# Patient Record
Sex: FEMALE | Race: WHITE | NOT HISPANIC OR LATINO | Employment: FULL TIME | URBAN - METROPOLITAN AREA
[De-identification: names, ages, dates, MRNs, and addresses within clinical notes are randomized per-mention and may not be internally consistent; named-entity substitution may affect disease eponyms.]

---

## 2017-01-07 DIAGNOSIS — J44.1 COPD EXACERBATION: Primary | ICD-10-CM

## 2017-01-07 RX ORDER — MOXIFLOXACIN HYDROCHLORIDE 400 MG/1
400 TABLET ORAL DAILY
Qty: 5 TABLET | Refills: 0 | Status: SHIPPED | OUTPATIENT
Start: 2017-01-07 | End: 2017-01-12

## 2017-01-16 ENCOUNTER — TELEPHONE (OUTPATIENT)
Dept: INTERNAL MEDICINE | Facility: CLINIC | Age: 57
End: 2017-01-16

## 2017-01-16 DIAGNOSIS — R06.09 DYSPNEA ON EXERTION: ICD-10-CM

## 2017-01-16 DIAGNOSIS — R60.1 GENERALIZED EDEMA: ICD-10-CM

## 2017-01-16 DIAGNOSIS — J44.9 CHRONIC OBSTRUCTIVE PULMONARY DISEASE, UNSPECIFIED COPD TYPE: Primary | ICD-10-CM

## 2017-01-16 DIAGNOSIS — R09.02 HYPOXIA: ICD-10-CM

## 2017-01-16 RX ORDER — FUROSEMIDE 40 MG/1
40 TABLET ORAL DAILY
Qty: 3 TABLET | Refills: 0 | Status: SHIPPED | OUTPATIENT
Start: 2017-01-16 | End: 2017-01-23 | Stop reason: SDUPTHER

## 2017-01-17 ENCOUNTER — TELEPHONE (OUTPATIENT)
Dept: INTERNAL MEDICINE | Facility: CLINIC | Age: 57
End: 2017-01-17

## 2017-01-17 DIAGNOSIS — R09.02 HYPOXIA: Primary | ICD-10-CM

## 2017-01-17 DIAGNOSIS — R60.1 GENERALIZED EDEMA: ICD-10-CM

## 2017-01-17 DIAGNOSIS — R06.09 DYSPNEA ON EXERTION: ICD-10-CM

## 2017-01-18 ENCOUNTER — HOSPITAL ENCOUNTER (OUTPATIENT)
Dept: PULMONOLOGY | Facility: CLINIC | Age: 57
Discharge: HOME | End: 2017-01-18
Payer: COMMERCIAL

## 2017-01-18 VITALS — WEIGHT: 161.63 LBS | BODY MASS INDEX: 29.74 KG/M2 | HEIGHT: 62 IN

## 2017-01-18 DIAGNOSIS — J44.9 CHRONIC OBSTRUCTIVE PULMONARY DISEASE, UNSPECIFIED COPD TYPE: ICD-10-CM

## 2017-01-18 DIAGNOSIS — R09.02 HYPOXIA: ICD-10-CM

## 2017-01-18 DIAGNOSIS — R06.09 DYSPNEA ON EXERTION: ICD-10-CM

## 2017-01-18 LAB
PRE FEV1 FVC: 42
PRE FEV1: 0.58
PRE FVC: 1.38
PREDICTED FEV1 FVC: 82
PREDICTED FEV1: 2.36
PREDICTED FVC: 2.89

## 2017-01-18 PROCEDURE — 82803 BLOOD GASES ANY COMBINATION: CPT | Mod: S$GLB,,, | Performed by: INTERNAL MEDICINE

## 2017-01-18 PROCEDURE — 36600 WITHDRAWAL OF ARTERIAL BLOOD: CPT | Mod: 59,S$GLB,, | Performed by: INTERNAL MEDICINE

## 2017-01-18 PROCEDURE — 94620 PR PULMONARY STRESS TESTING,SIMPLE: CPT | Mod: S$GLB,,, | Performed by: INTERNAL MEDICINE

## 2017-01-18 PROCEDURE — 94727 GAS DIL/WSHOT DETER LNG VOL: CPT | Mod: S$GLB,,, | Performed by: INTERNAL MEDICINE

## 2017-01-18 PROCEDURE — 94010 BREATHING CAPACITY TEST: CPT | Mod: 59,S$GLB,, | Performed by: INTERNAL MEDICINE

## 2017-01-18 PROCEDURE — 94729 DIFFUSING CAPACITY: CPT | Mod: S$GLB,,, | Performed by: INTERNAL MEDICINE

## 2017-01-22 NOTE — PROCEDURES
Diana Sellers is a 56 y.o.  female patient, who presents for a 6 minute walk test ordered by Bladimir Bowles MD.  The diagnosis is COPD/Emphysema.  The patient's BMI is 29.6 kg/m2.  Predicted distance (lower limit of normal) is 372.65 meters.      Test Results:    The test was completed with stops due to shortness of breath.  The patient stopped 1 time for a total of 14 seconds.  The total time walked was 346 seconds.  During walking, the patient reported:  Dyspnea. The patient used no assistive devices during testing.     01/18/2017---------Distance: 426.72 meters (1400 feet)     O2 Sat % Supplemental Oxygen Heart Rate Blood Pressure Erich Scale   Pre-exercise  (Resting) 98 % Room Air 86 bpm 113/75 mmHg 2   During Exercise 85 % Room Air 130 bpm 140/79 mmHg 7-8   Post-exercise  (Recovery) 95 % Room Air  98 bpm 136/75 mmHg       Recovery Time: 117 seconds    Performing nurse/tech: Elsa LONGO      PREVIOUS STUDY:   The patient has not had a previous study.      CLINICAL INTERPRETATION:  Six minute walk distance is 426.72 meters (1400 feet) with very heavy dyspnea.  During exercise, there was significant desaturation while breathing room air.  Blood pressure remained stable and Heart rate increased significantly with walking.  The patient did not report non-pulmonary symptoms during exercise.  The patient did complete the study, walking 346 seconds of the 360 second test.  The patient may benefit from using supplemental oxygen during exertion.  No previous study performed.  Based upon age and body mass index, exercise capacity is normal.

## 2017-01-23 DIAGNOSIS — R06.09 DYSPNEA ON EXERTION: ICD-10-CM

## 2017-01-23 DIAGNOSIS — R09.02 HYPOXIA: ICD-10-CM

## 2017-01-23 DIAGNOSIS — R60.1 GENERALIZED EDEMA: ICD-10-CM

## 2017-01-23 RX ORDER — FUROSEMIDE 40 MG/1
40 TABLET ORAL DAILY
Qty: 3 TABLET | Refills: 0 | Status: SHIPPED | OUTPATIENT
Start: 2017-01-23 | End: 2017-02-07

## 2017-01-24 ENCOUNTER — HOSPITAL ENCOUNTER (OUTPATIENT)
Dept: CARDIOLOGY | Facility: CLINIC | Age: 57
Discharge: HOME | End: 2017-01-24
Payer: COMMERCIAL

## 2017-01-24 ENCOUNTER — TELEPHONE (OUTPATIENT)
Dept: INTERNAL MEDICINE | Facility: CLINIC | Age: 57
End: 2017-01-24

## 2017-01-24 DIAGNOSIS — R60.1 GENERALIZED EDEMA: ICD-10-CM

## 2017-01-24 DIAGNOSIS — R09.02 HYPOXIA: ICD-10-CM

## 2017-01-24 DIAGNOSIS — R06.09 DYSPNEA ON EXERTION: ICD-10-CM

## 2017-01-24 LAB
DIASTOLIC DYSFUNCTION: NO
ESTIMATED PA SYSTOLIC PRESSURE: 40
RETIRED EF AND QEF - SEE NOTES: 65 (ref 55–65)
TRICUSPID VALVE REGURGITATION: NORMAL

## 2017-01-24 PROCEDURE — 93306 TTE W/DOPPLER COMPLETE: CPT | Mod: S$GLB,,, | Performed by: INTERNAL MEDICINE

## 2017-02-07 DIAGNOSIS — R60.9 EDEMA, UNSPECIFIED TYPE: Primary | ICD-10-CM

## 2017-02-07 RX ORDER — FUROSEMIDE 40 MG/1
40 TABLET ORAL DAILY PRN
Qty: 30 TABLET | Refills: 11 | Status: SHIPPED | OUTPATIENT
Start: 2017-02-07 | End: 2018-05-07

## 2017-03-21 DIAGNOSIS — J40 BRONCHITIS: Primary | ICD-10-CM

## 2017-03-21 RX ORDER — AZITHROMYCIN 250 MG/1
TABLET, FILM COATED ORAL
Qty: 6 TABLET | Refills: 0 | Status: SHIPPED | OUTPATIENT
Start: 2017-03-21 | End: 2017-03-26

## 2017-06-30 ENCOUNTER — TELEPHONE (OUTPATIENT)
Dept: INTERNAL MEDICINE | Facility: CLINIC | Age: 57
End: 2017-06-30

## 2017-06-30 NOTE — TELEPHONE ENCOUNTER
----- Message from Selina Farley sent at 6/29/2017  4:07 PM CDT -----  Contact: self/[phone]  Patient called in regards needing to find out if her annual can be done, early than 10/04/17. Please call and advise.       Thank you!!!

## 2017-09-01 ENCOUNTER — HOSPITAL ENCOUNTER (OUTPATIENT)
Dept: RADIOLOGY | Facility: HOSPITAL | Age: 57
Discharge: HOME | End: 2017-09-01
Attending: INTERNAL MEDICINE
Payer: COMMERCIAL

## 2017-09-01 ENCOUNTER — OFFICE VISIT (OUTPATIENT)
Dept: INTERNAL MEDICINE | Facility: CLINIC | Age: 57
End: 2017-09-01
Payer: COMMERCIAL

## 2017-09-01 VITALS
DIASTOLIC BLOOD PRESSURE: 80 MMHG | SYSTOLIC BLOOD PRESSURE: 128 MMHG | OXYGEN SATURATION: 96 % | HEART RATE: 87 BPM | TEMPERATURE: 98 F | HEIGHT: 62 IN | BODY MASS INDEX: 28.71 KG/M2 | WEIGHT: 156 LBS

## 2017-09-01 DIAGNOSIS — J44.1 COPD EXACERBATION: Primary | ICD-10-CM

## 2017-09-01 DIAGNOSIS — J44.1 COPD EXACERBATION: ICD-10-CM

## 2017-09-01 PROCEDURE — 71020 XR CHEST PA AND LATERAL: CPT | Mod: TC

## 2017-09-01 PROCEDURE — 3008F BODY MASS INDEX DOCD: CPT | Mod: S$GLB,,, | Performed by: INTERNAL MEDICINE

## 2017-09-01 PROCEDURE — 3074F SYST BP LT 130 MM HG: CPT | Mod: S$GLB,,, | Performed by: INTERNAL MEDICINE

## 2017-09-01 PROCEDURE — 99999 PR PBB SHADOW E&M-EST. PATIENT-LVL III: CPT | Mod: PBBFAC,,, | Performed by: INTERNAL MEDICINE

## 2017-09-01 PROCEDURE — 71020 XR CHEST PA AND LATERAL: CPT | Mod: 26,,, | Performed by: RADIOLOGY

## 2017-09-01 PROCEDURE — 96372 THER/PROPH/DIAG INJ SC/IM: CPT | Mod: 59,S$GLB,, | Performed by: INTERNAL MEDICINE

## 2017-09-01 PROCEDURE — 3079F DIAST BP 80-89 MM HG: CPT | Mod: S$GLB,,, | Performed by: INTERNAL MEDICINE

## 2017-09-01 PROCEDURE — 94640 AIRWAY INHALATION TREATMENT: CPT | Mod: S$GLB,,, | Performed by: INTERNAL MEDICINE

## 2017-09-01 PROCEDURE — 99213 OFFICE O/P EST LOW 20 MIN: CPT | Mod: 25,S$GLB,, | Performed by: INTERNAL MEDICINE

## 2017-09-01 RX ORDER — AMOXICILLIN AND CLAVULANATE POTASSIUM 875; 125 MG/1; MG/1
1 TABLET, FILM COATED ORAL EVERY 12 HOURS
Qty: 20 TABLET | Refills: 0 | Status: SHIPPED | OUTPATIENT
Start: 2017-09-01 | End: 2017-09-01

## 2017-09-01 RX ORDER — METHYLPREDNISOLONE 4 MG/1
TABLET ORAL
Qty: 1 PACKAGE | Refills: 0 | Status: SHIPPED | OUTPATIENT
Start: 2017-09-01 | End: 2017-09-22

## 2017-09-01 RX ORDER — IPRATROPIUM BROMIDE AND ALBUTEROL SULFATE 2.5; .5 MG/3ML; MG/3ML
3 SOLUTION RESPIRATORY (INHALATION)
Status: COMPLETED | OUTPATIENT
Start: 2017-09-01 | End: 2017-09-01

## 2017-09-01 RX ORDER — TRIAMCINOLONE ACETONIDE 40 MG/ML
40 INJECTION, SUSPENSION INTRA-ARTICULAR; INTRAMUSCULAR
Status: COMPLETED | OUTPATIENT
Start: 2017-09-01 | End: 2017-09-01

## 2017-09-01 RX ORDER — AZITHROMYCIN 250 MG/1
TABLET, FILM COATED ORAL
Qty: 6 TABLET | Refills: 0 | Status: SHIPPED | OUTPATIENT
Start: 2017-09-01 | End: 2017-09-06

## 2017-09-01 RX ADMIN — IPRATROPIUM BROMIDE AND ALBUTEROL SULFATE 3 ML: 2.5; .5 SOLUTION RESPIRATORY (INHALATION) at 02:09

## 2017-09-01 RX ADMIN — TRIAMCINOLONE ACETONIDE 40 MG: 40 INJECTION, SUSPENSION INTRA-ARTICULAR; INTRAMUSCULAR at 02:09

## 2017-09-01 NOTE — PROGRESS NOTES
"Subjective:       Patient ID: Diana Sellers is a 57 y.o. female.    Chief Complaint: URI ("possible" otc equivalent of dayquil)    HPI     Patient is a 57 year old female here today for URI concern.  Reports last week had sx of sore throat, rhinorrhea, nasal congestion and now has progressed into chest congestion, productive cough with thicker sputum, chest tightness with wheezing during coughing spells, feeling sob. No fever/chills. No GI upset.     Review of Systems   Constitutional: Negative for chills and fever.   HENT: Negative for congestion, postnasal drip, rhinorrhea, sinus pain, sinus pressure and sore throat.    Respiratory: Positive for cough, chest tightness, shortness of breath and wheezing.    Gastrointestinal: Negative for abdominal pain, diarrhea and vomiting.       Objective:      Physical Exam   Constitutional: She is oriented to person, place, and time. She appears well-developed and well-nourished. No distress.   HENT:   Head: Normocephalic and atraumatic.   Right Ear: External ear normal.   Left Ear: External ear normal.   No effusion posterior to BL TM  Posterior oropharyngeal erythema  No tonsillar exudate   Eyes: Conjunctivae and EOM are normal. Pupils are equal, round, and reactive to light.   Neck: Normal range of motion. Neck supple. No thyromegaly present.   Cardiovascular: Normal rate, regular rhythm, normal heart sounds and intact distal pulses.    No murmur heard.  Pulmonary/Chest: Effort normal and breath sounds normal. No respiratory distress. She has no wheezes. She has no rales.   Musculoskeletal: She exhibits no edema.   Lymphadenopathy:     She has no cervical adenopathy.   Neurological: She is alert and oriented to person, place, and time.   Skin: Skin is warm and dry. She is not diaphoretic.   Nursing note and vitals reviewed.      Assessment:       1. COPD exacerbation        Plan:       Patient with concern for acute sinus infection that has now caused COPD " exacerbation  CXR ordered  Duoneb x 1 now  Kenalog 40 mg IM X 1, start Medrol Dose Pack tomorrow  Rx for Azithromycin sent to pharmacy  Please contact us if no improvement or sx worsen

## 2017-09-02 ENCOUNTER — PATIENT MESSAGE (OUTPATIENT)
Dept: INTERNAL MEDICINE | Facility: CLINIC | Age: 57
End: 2017-09-02

## 2017-09-02 ENCOUNTER — TELEPHONE (OUTPATIENT)
Dept: INTERNAL MEDICINE | Facility: CLINIC | Age: 57
End: 2017-09-02

## 2017-09-02 DIAGNOSIS — R93.89 ABNORMAL CHEST X-RAY: Primary | ICD-10-CM

## 2017-09-05 ENCOUNTER — HOSPITAL ENCOUNTER (OUTPATIENT)
Dept: RADIOLOGY | Facility: HOSPITAL | Age: 57
Discharge: HOME | End: 2017-09-05
Attending: INTERNAL MEDICINE
Payer: COMMERCIAL

## 2017-09-05 DIAGNOSIS — R93.89 ABNORMAL CHEST X-RAY: ICD-10-CM

## 2017-09-05 PROCEDURE — 71260 CT THORAX DX C+: CPT | Mod: TC

## 2017-09-05 PROCEDURE — 71260 CT THORAX DX C+: CPT | Mod: 26,,, | Performed by: RADIOLOGY

## 2017-09-05 PROCEDURE — 25500020 PHARM REV CODE 255: Performed by: INTERNAL MEDICINE

## 2017-09-05 RX ADMIN — IOHEXOL 75 ML: 350 INJECTION, SOLUTION INTRAVENOUS at 03:09

## 2017-09-06 ENCOUNTER — TELEPHONE (OUTPATIENT)
Dept: INTERNAL MEDICINE | Facility: CLINIC | Age: 57
End: 2017-09-06

## 2017-09-06 DIAGNOSIS — R91.8 LUNG MASS: Primary | ICD-10-CM

## 2017-09-06 NOTE — TELEPHONE ENCOUNTER
----- Message from Roberth Christianson MD sent at 9/6/2017  8:08 AM CDT -----  Please let patient know that her CT scan shows a nodule in the left lung that is abnormal. I would like her to see the pulmonary doctor ASAP for their input regarding this. The referral is in, please help her schedule.   Thanks.

## 2017-09-07 ENCOUNTER — PATIENT MESSAGE (OUTPATIENT)
Dept: PULMONOLOGY | Facility: CLINIC | Age: 57
End: 2017-09-07

## 2017-09-07 ENCOUNTER — TELEPHONE (OUTPATIENT)
Dept: PULMONOLOGY | Facility: CLINIC | Age: 57
End: 2017-09-07

## 2017-09-07 ENCOUNTER — OFFICE VISIT (OUTPATIENT)
Dept: PULMONOLOGY | Facility: CLINIC | Age: 57
End: 2017-09-07
Payer: COMMERCIAL

## 2017-09-07 VITALS
DIASTOLIC BLOOD PRESSURE: 86 MMHG | TEMPERATURE: 98 F | OXYGEN SATURATION: 95 % | HEIGHT: 63 IN | SYSTOLIC BLOOD PRESSURE: 130 MMHG | HEART RATE: 76 BPM | BODY MASS INDEX: 27.89 KG/M2 | WEIGHT: 157.44 LBS

## 2017-09-07 DIAGNOSIS — R91.1 NODULE OF LEFT LUNG: Primary | ICD-10-CM

## 2017-09-07 DIAGNOSIS — J44.9 CHRONIC OBSTRUCTIVE PULMONARY DISEASE, UNSPECIFIED COPD TYPE: ICD-10-CM

## 2017-09-07 PROCEDURE — 99204 OFFICE O/P NEW MOD 45 MIN: CPT | Mod: S$GLB,,, | Performed by: NURSE PRACTITIONER

## 2017-09-07 PROCEDURE — 3075F SYST BP GE 130 - 139MM HG: CPT | Mod: S$GLB,,, | Performed by: NURSE PRACTITIONER

## 2017-09-07 PROCEDURE — 3079F DIAST BP 80-89 MM HG: CPT | Mod: S$GLB,,, | Performed by: NURSE PRACTITIONER

## 2017-09-07 PROCEDURE — 99999 PR PBB SHADOW E&M-EST. PATIENT-LVL IV: CPT | Mod: PBBFAC,,, | Performed by: NURSE PRACTITIONER

## 2017-09-07 PROCEDURE — 3008F BODY MASS INDEX DOCD: CPT | Mod: S$GLB,,, | Performed by: NURSE PRACTITIONER

## 2017-09-07 RX ORDER — FLUTICASONE PROPIONATE AND SALMETEROL 500; 50 UG/1; UG/1
1 POWDER RESPIRATORY (INHALATION) 2 TIMES DAILY
Qty: 60 EACH | Refills: 11 | Status: SHIPPED | OUTPATIENT
Start: 2017-09-07 | End: 2018-09-19

## 2017-09-07 NOTE — TELEPHONE ENCOUNTER
Pt was called and Pt stated that an appointment was made for 9am this morning with Pulmonary and that she had already been to the appointment.     Cristal

## 2017-09-07 NOTE — PROGRESS NOTES
Subjective:       Patient ID: Diana Sellers is a 57 y.o. female.    Chief Complaint: Lung Nodule    HPI  Diana Sellers is a 57 y.o. female who presents today as new patient referred for evaluation of lung nodule. Her medical hx includes COPD, HTN, and allergies. Her recent CXR revealed a nodularity which led to follow up CT chest. CT results (9/5/17) showed a 2.2 x 1.3 cm soft tissue nodule to the left upper lobe at the medial aspect of anterior segment which was not present in CT chest 10/22/15. Patient's PFT's from January this year showed severe obstruction with FEV1 of 0.58 L 25%. Patient reports having a respiratory infection at that time. She is currently compliant with her Advair 250 mcg and spiriva. She takes her singulair for allergies, however she still suffers from post nasal drainage. She has not tried sinus rinse regimen. She states she gets short of breath often and avoids stairs. She had a pulmonary stress test in January which show desaturation to 85%, however patient states she does not wish to use oxygen at this time. She takes lasix 2 times a week which helps with excess fluid. She has a 35 pack year smoking history but quit in Jan 2014. She is a nurse. She has a family hx of father with colon cancer. She denies chest pain, fever, chills, hemoptysis.    Review of patient's allergies indicates:   Allergen Reactions    Ace inhibitors      Past Medical History:   Diagnosis Date    Allergy     Anxiety     COPD (chronic obstructive pulmonary disease)     Fractures 2007    History rib fractures with coughing, last in 2007.    History of vitamin D deficiency     mild in past, treated with 50k units Vit D3 weekly, no longer on treatment    Hypertension      Past Surgical History:   Procedure Laterality Date    BONE BIOPSY Right     shoulder    COLONOSCOPY  late 90s?    [facility]    DILATION AND CURETTAGE OF UTERUS  1980    WISDOM TOOTH EXTRACTION       "1981     Family History     Problem Relation (Age of Onset)    Cancer Father    Colon cancer Father    Diabetes Mother    Hypertension Mother, Father    Retinal detachment Father    Spina bifida Brother        Social History Main Topics    Smoking status: Former Smoker     Packs/day: 1.00     Years: 35.00     Types: Cigarettes     Quit date: 1/1/2014    Smokeless tobacco: Never Used      Comment: she quit!    Alcohol use 1.8 oz/week     3 Glasses of wine per week      Comment: 3 glass wine/day    Drug use: No    Sexual activity: Not Currently     Birth control/ protection: Post-menopausal      Comment: menopause 10 years ago, 2 children, 1 miscairrage       Review of Systems   Constitutional: Negative for fever and chills.   HENT: Positive for postnasal drip.    Respiratory: Positive for cough, shortness of breath and wheezing. Negative for hemoptysis.    Cardiovascular: Negative for chest pain and leg swelling.   Genitourinary: Negative for difficulty urinating.   Endocrine: Negative for polydipsia.    Musculoskeletal: Negative for gait problem.   Neurological: Negative for headaches.   Hematological: Negative for adenopathy.   Psychiatric/Behavioral: Negative for confusion.       Objective:       Vitals:    09/07/17 0859   BP: 130/86   Pulse: 76   Temp: 98 °F (36.7 °C)   SpO2: 95%   Weight: 71.4 kg (157 lb 6.5 oz)   Height: 5' 3" (1.6 m)     Physical Exam   Constitutional: She is oriented to person, place, and time. She appears well-developed and well-nourished.   HENT:   Head: Normocephalic.   Neck: Normal range of motion.   Cardiovascular: Normal rate, regular rhythm, normal heart sounds and intact distal pulses.    Pulmonary/Chest: Normal expansion, symmetric chest wall expansion and effort normal. No respiratory distress. She has decreased breath sounds (in the bases bilaterally ). She has wheezes (right lower posterior lung field). She has no rhonchi. She has no rales.   Abdominal: Soft. Bowel sounds are " normal.   Musculoskeletal: She exhibits no edema.   Lymphadenopathy: No supraclavicular adenopathy is present.     She has no cervical adenopathy.   Neurological: She is alert and oriented to person, place, and time.   Skin: Skin is warm and dry.   Psychiatric: She has a normal mood and affect.   Vitals reviewed.    Personal Diagnostic Review    PFT's reviewed 2017: severe obstruction FEV1 0.58L 25%, severe air trapping with hyperinflation, decreased DLCO 50%  CT chest 17 reviewed with Dr. Lopes: 2.2 x 1.3 cm soft tissue nodule to the QUOC medial aspect of anterior segment  Echo reviewed 2017: EF 55-60%                                                                                                                                                      Assessment:           Outpatient Encounter Prescriptions as of 2017   Medication Sig Dispense Refill    albuterol (VENTOLIN HFA) 90 mcg/actuation inhaler Inhale 2 puffs into the lungs every 6 hours as needed for wheezing. 18 g 11    albuterol-ipratropium 2.5mg-0.5mg/3mL (DUO-NEB) 0.5 mg-3 mg(2.5 mg base)/3 mL nebulizer solution Take 3 mLs by nebulization every 6 (six) hours as needed. 90 vial 3    furosemide (LASIX) 40 MG tablet Take 1 tablet (40 mg total) by mouth daily as needed (edema). 30 tablet 11    hydrOXYzine (VISTARIL) 50 MG Cap Take 1 capsule (50 mg total) by mouth every 6 (six) hours as needed (anxiety or insomnia). 30 capsule 2    ibuprofen (ADVIL,MOTRIN) 200 MG tablet Take 600 mg by mouth daily as needed.        inhalation device (AEROCHAMBER PLUS FLOW-VU) Use as directed for inhalation. 1 Device 0    methylPREDNISolone (MEDROL DOSEPACK) 4 mg tablet use as directed 1 Package 0    montelukast (SINGULAIR) 10 mg tablet Take 1 tablet (10 mg total) by mouth once daily. 30 tablet 11    predniSONE (DELTASONE) 20 MG tablet Take 1 tablet (20 mg total) by mouth once daily. 30 tablet 0    [] azithromycin (Z-IVORY) 250 MG tablet Take 2 tablets  by mouth on day 1; Take 1 tablet by mouth on days 2-5 6 tablet 0    busPIRone (BUSPAR) 30 MG Tab Take 1 tablet (30 mg total) by mouth 2 (two) times daily. 180 tablet 3    fluticasone-salmeterol 500-50 mcg/dose (ADVAIR DISKUS) 500-50 mcg/dose DsDv diskus inhaler Inhale 1 puff into the lungs 2 (two) times daily. Controller 60 each 11    propranolol (INDERAL) 10 MG tablet Take 1 tablet (10 mg total) by mouth every 6 (six) hours as needed (tremors). 90 tablet 11    tiotropium (SPIRIVA) 18 mcg inhalation capsule Inhale 1 capsule (18 mcg total) into the lungs once daily. 90 capsule 3    valsartan (DIOVAN) 160 MG tablet Take 1 tablet (160 mg total) by mouth once daily. 90 tablet 3     No facility-administered encounter medications on file as of 9/7/2017.      Problem List Items Addressed This Visit        Pulmonary    Chronic obstructive pulmonary disease    Relevant Orders    DLCO-Carbon Monoxide Diffusing Capacity    LUNG VOLUMES    Spirometry with/without bronchodilator    Alpha 1 Antitrypsin Defiiciency Profile      Other Visit Diagnoses     Nodule of left lung    -  Primary    Relevant Orders    IR Biopsy Lung    Amb Consult to [facility] Interventional RAD        Plan:       · Referral IR with IR biopsy for 2.2 x 1.3 cm QUOC soft tissue nodule. Will discuss case at [facility].  · Increased Advair to 500 mcg  · Continue with Spiriva  · Take albuterol for rescue and prevention  · Repeat Rewey, Lung Volumes, and DLCO in 2 weeks   · Given the patient's age and severity of COPD feel its appropriate to obtain Alpha 1 antitrypsin profile.  · Recommended supplemental oxygen with activity (6MWT in [place] saturation 85%), patient declined at this time.  Contact should any issues or concerns should arise.   Patient verbalized understanding of all information, instruction, education, recommendations provided and agrees with plan of care.

## 2017-09-07 NOTE — LETTER
September 7, 2017      Roberth Christianson MD  [address]  [place] [address]           [facility] Pulmonary Services  [address]  [facility] [address]  Phone: [phone]          Patient: Diana Sellers   MR Number: [id number]   YOB: 1960   Date of Visit: 9/7/2017       Dear Dr. Roberth Christianson:    Thank you for referring Diana Sellers to me for evaluation. Attached you will find relevant portions of my assessment and plan of care.    If you have questions, please do not hesitate to call me. I look forward to following Diana Sellers along with you.    Sincerely,    Danielle Palomo, NP    Enclosure  CC:  No Recipients    If you would like to receive this communication electronically, please contact [email or web address] or [phone] to request more information on [facility] Link access.    For providers and/or their staff who would like to refer a patient to [facility], please contact us through our one-stop-shop provider referral line, [facility], at [phone].    If you feel you have received this communication in error or would no longer like to receive these types of communications, please e-mail [email or web address]

## 2017-09-12 ENCOUNTER — LAB VISIT (OUTPATIENT)
Dept: LAB | Facility: HOSPITAL | Age: 57
End: 2017-09-12
Payer: COMMERCIAL

## 2017-09-12 DIAGNOSIS — J44.9 CHRONIC OBSTRUCTIVE PULMONARY DISEASE, UNSPECIFIED COPD TYPE: ICD-10-CM

## 2017-09-12 PROCEDURE — 36415 COLL VENOUS BLD VENIPUNCTURE: CPT

## 2017-09-12 PROCEDURE — 82542 COL CHROMOTOGRAPHY QUAL/QUAN: CPT

## 2017-09-12 PROCEDURE — 82104 ALPHA-1-ANTITRYPSIN PHENO: CPT

## 2017-09-13 DIAGNOSIS — R91.1 NODULE OF LEFT LUNG: Primary | ICD-10-CM

## 2017-09-15 LAB
A1AT PROTEOTYPE S/Z, LC-MS/MS: ABNORMAL
A1AT SERPL NEPH-MCNC: 98 MG/DL

## 2017-09-18 LAB — A1AT PHENOTYPE: NORMAL BANDS

## 2017-09-19 DIAGNOSIS — R91.1 NODULE OF LEFT LUNG: Primary | ICD-10-CM

## 2017-09-20 ENCOUNTER — HOSPITAL ENCOUNTER (OUTPATIENT)
Facility: HOSPITAL | Age: 57
Discharge: HOME | End: 2017-09-20
Attending: RADIOLOGY | Admitting: RADIOLOGY
Payer: COMMERCIAL

## 2017-09-20 VITALS
SYSTOLIC BLOOD PRESSURE: 132 MMHG | WEIGHT: 157 LBS | OXYGEN SATURATION: 97 % | HEART RATE: 63 BPM | RESPIRATION RATE: 15 BRPM | HEIGHT: 63 IN | BODY MASS INDEX: 27.82 KG/M2 | TEMPERATURE: 98 F | DIASTOLIC BLOOD PRESSURE: 73 MMHG

## 2017-09-20 DIAGNOSIS — R91.1 NODULE OF LEFT LUNG: ICD-10-CM

## 2017-09-20 LAB
GRAM STN SPEC: NORMAL
GRAM STN SPEC: NORMAL

## 2017-09-20 PROCEDURE — 87116 MYCOBACTERIA CULTURE: CPT

## 2017-09-20 PROCEDURE — 88333 PATH CONSLTJ SURG CYTO XM 1: CPT | Mod: 26,,, | Performed by: PATHOLOGY

## 2017-09-20 PROCEDURE — 87205 SMEAR GRAM STAIN: CPT

## 2017-09-20 PROCEDURE — 87070 CULTURE OTHR SPECIMN AEROBIC: CPT

## 2017-09-20 PROCEDURE — 63600175 PHARM REV CODE 636 W HCPCS: Performed by: RADIOLOGY

## 2017-09-20 PROCEDURE — 88305 TISSUE EXAM BY PATHOLOGIST: CPT | Performed by: PATHOLOGY

## 2017-09-20 PROCEDURE — 88341 IMHCHEM/IMCYTCHM EA ADD ANTB: CPT | Mod: 26,,, | Performed by: PATHOLOGY

## 2017-09-20 PROCEDURE — 25000003 PHARM REV CODE 250: Performed by: FAMILY MEDICINE

## 2017-09-20 PROCEDURE — 88342 IMHCHEM/IMCYTCHM 1ST ANTB: CPT | Mod: 26,,, | Performed by: PATHOLOGY

## 2017-09-20 PROCEDURE — 87015 SPECIMEN INFECT AGNT CONCNTJ: CPT

## 2017-09-20 PROCEDURE — 88305 TISSUE EXAM BY PATHOLOGIST: CPT | Mod: 26,,, | Performed by: PATHOLOGY

## 2017-09-20 PROCEDURE — 87102 FUNGUS ISOLATION CULTURE: CPT

## 2017-09-20 PROCEDURE — 87176 TISSUE HOMOGENIZATION CULTR: CPT

## 2017-09-20 PROCEDURE — 87075 CULTR BACTERIA EXCEPT BLOOD: CPT

## 2017-09-20 RX ORDER — MIDAZOLAM HYDROCHLORIDE 1 MG/ML
1 INJECTION INTRAMUSCULAR; INTRAVENOUS
Status: DISCONTINUED | OUTPATIENT
Start: 2017-09-20 | End: 2017-09-20 | Stop reason: HOSPADM

## 2017-09-20 RX ORDER — FENTANYL CITRATE 50 UG/ML
50 INJECTION, SOLUTION INTRAMUSCULAR; INTRAVENOUS
Status: DISCONTINUED | OUTPATIENT
Start: 2017-09-20 | End: 2017-09-20 | Stop reason: HOSPADM

## 2017-09-20 RX ORDER — SODIUM CHLORIDE 9 MG/ML
500 INJECTION, SOLUTION INTRAVENOUS ONCE
Status: COMPLETED | OUTPATIENT
Start: 2017-09-20 | End: 2017-09-20

## 2017-09-20 RX ORDER — FENTANYL CITRATE 50 UG/ML
INJECTION, SOLUTION INTRAMUSCULAR; INTRAVENOUS CODE/TRAUMA/SEDATION MEDICATION
Status: COMPLETED | OUTPATIENT
Start: 2017-09-20 | End: 2017-09-20

## 2017-09-20 RX ORDER — MIDAZOLAM HYDROCHLORIDE 1 MG/ML
INJECTION, SOLUTION INTRAMUSCULAR; INTRAVENOUS CODE/TRAUMA/SEDATION MEDICATION
Status: COMPLETED | OUTPATIENT
Start: 2017-09-20 | End: 2017-09-20

## 2017-09-20 RX ADMIN — MIDAZOLAM HYDROCHLORIDE 0.5 MG: 1 INJECTION, SOLUTION INTRAMUSCULAR; INTRAVENOUS at 10:09

## 2017-09-20 RX ADMIN — SODIUM CHLORIDE 500 ML: 900 INJECTION, SOLUTION INTRAVENOUS at 09:09

## 2017-09-20 RX ADMIN — FENTANYL CITRATE 25 MCG: 50 INJECTION, SOLUTION INTRAMUSCULAR; INTRAVENOUS at 10:09

## 2017-09-20 NOTE — PROGRESS NOTES
Lung biopsy complete. Patient tolerated well. Band-Aid applied to chest. CDI. Patient to [facility] for recovery. report to be given at bedside.

## 2017-09-20 NOTE — PROCEDURES
Radiology Post-Procedure Note    Pre Op Diagnosis: Mediastinal/left lung lung mass    Post Op Diagnosis: Mediastinal/left lung lung mass    Procedure: Mediastinal/left lung lung mass biopsy    Procedure performed by: Kevin Shah MD    Written Informed Consent Obtained: Yes    Specimen Removed: YES 5    Estimated Blood Loss: Minimal    Findings:   Using CT guidance a 19g sheath needle was placed into a mediastinal/left lung lung mass. 20g monopty biopsy gun used to take 5 core biopsy samples. Specimen sent to pathology and bacteriology.     Additional specimen sent to lab: Yes    Patient tolerated procedure well.    Juan Alberto Hinson  Radiology

## 2017-09-20 NOTE — PROGRESS NOTES
Pt arrived to [facility]. Report received from CHARLOTTE Browning.

## 2017-09-20 NOTE — H&P
Radiology History & Physical      SUBJECTIVE:     Chief Complaint: Lung lesion.    History of Present Illness:  Diana Sellers is a 57 y.o. female who presents for CT guided lung biopsy.  Past Medical History:   Diagnosis Date    Allergy     Anxiety     COPD (chronic obstructive pulmonary disease)     Fractures 2007    History rib fractures with coughing, last in 2007.    History of vitamin D deficiency     mild in past, treated with 50k units Vit D3 weekly, no longer on treatment    Hypertension      Past Surgical History:   Procedure Laterality Date    BONE BIOPSY Right     shoulder    COLONOSCOPY  late 90s?    [facility]2003 or [facility]    DILATION AND CURETTAGE OF UTERUS  1980    WISDOM TOOTH EXTRACTION      1981       Home Meds:   Prior to Admission medications    Medication Sig Start Date End Date Taking? Authorizing Provider   albuterol (VENTOLIN HFA) 90 mcg/actuation inhaler Inhale 2 puffs into the lungs every 6 hours as needed for wheezing. 7/6/16   Bladimir Bowles MD   albuterol-ipratropium 2.5mg-0.5mg/3mL (DUO-NEB) 0.5 mg-3 mg(2.5 mg base)/3 mL nebulizer solution Take 3 mLs by nebulization every 6 (six) hours as needed. 7/6/16   Bladimir Bowles MD   busPIRone (BUSPAR) 30 MG Tab Take 1 tablet (30 mg total) by mouth 2 (two) times daily. 7/6/16 7/6/17  Bladimir Bowles MD   fluticasone-salmeterol 500-50 mcg/dose (ADVAIR DISKUS) 500-50 mcg/dose DsDv diskus inhaler Inhale 1 puff into the lungs 2 (two) times daily. Controller 9/7/17 9/7/18  Danielle Palomo NP   furosemide (LASIX) 40 MG tablet Take 1 tablet (40 mg total) by mouth daily as needed (edema). 2/7/17 2/7/18  Bladimir Bowles MD   hydrOXYzine (VISTARIL) 50 MG Cap Take 1 capsule (50 mg total) by mouth every 6 (six) hours as needed (anxiety or insomnia). 6/3/15   Rocco Salomon MD   ibuprofen (ADVIL,MOTRIN) 200 MG tablet Take 600 mg by mouth daily as needed.      Historical Provider, MD   inhalation device (AEROCHAMBER PLUS  FLOW-VU) Use as directed for inhalation. 3/9/15   Bladimir Bowles MD   methylPREDNISolone (MEDROL DOSEPACK) 4 mg tablet use as directed 9/1/17 9/22/17  Roberth Christianson MD   montelukast (SINGULAIR) 10 mg tablet Take 1 tablet (10 mg total) by mouth once daily. 9/29/16   Bladimir Bowles MD   predniSONE (DELTASONE) 20 MG tablet Take 1 tablet (20 mg total) by mouth once daily. 3/31/16   Bladimir Bowles MD   propranolol (INDERAL) 10 MG tablet Take 1 tablet (10 mg total) by mouth every 6 (six) hours as needed (tremors). 7/6/16 7/6/17  Bladimir Bowles MD   tiotropium (SPIRIVA) 18 mcg inhalation capsule Inhale 1 capsule (18 mcg total) into the lungs once daily. 7/6/16 7/6/17  Bladimir Bowles MD   valsartan (DIOVAN) 160 MG tablet Take 1 tablet (160 mg total) by mouth once daily. 7/6/16 7/6/17  Bladimir Bowles MD     Anticoagulants/Antiplatelets: no anticoagulation    Allergies:   Review of patient's allergies indicates:   Allergen Reactions    Ace inhibitors      Sedation History:  no adverse reactions    Review of Systems:   Hematological: no known coagulopathies  Respiratory: no shortness of breath  Cardiovascular: no chest pain  Gastrointestinal: no abdominal pain  Genito-Urinary: no dysuria  Musculoskeletal: negative  Neurological: no TIA or stroke symptoms         OBJECTIVE:     Vital Signs (Most Recent)       Physical Exam:  ASA: 2  Mallampati: 2    General: no acute distress  Mental Status: alert and oriented to person, place and time  HEENT: normocephalic, atraumatic  Chest: unlabored breathing  Heart: regular heart rate  Abdomen: nondistended  Extremity: moves all extremities    Laboratory  No results found for: INR    Lab Results   Component Value Date    WBC 7.11 07/05/2016    HGB 12.4 07/05/2016    HCT 37.9 07/05/2016    MCV 97 07/05/2016     07/05/2016      Lab Results   Component Value Date     07/05/2016     07/05/2016    K 4.6 07/05/2016     07/05/2016    CO2 24 07/05/2016    BUN 10  07/05/2016    CREATININE 0.8 09/05/2017    CALCIUM 9.8 07/05/2016    ALT 17 07/05/2016    AST 25 07/05/2016    ALBUMIN 3.8 07/05/2016    BILITOT 0.7 07/05/2016       ASSESSMENT/PLAN:     Sedation Plan: Moderate.  Patient will undergo image guided mediastinal/ lung biopsy.    Juan Alberto Hinson  Radiology

## 2017-09-20 NOTE — PLAN OF CARE
Pt arrived to procedure room. Pt oriented to unit and staff. Comfort measures utilized. Pt safely transferred from stretcher to procedural table. Safety strap applied. Blankets applied. Pt prepped and draped utilizing standard sterile technique. Pt resting comfortably. Denies pain/discomfort. Will continue to monitor. See flow sheets for monitoring.

## 2017-09-20 NOTE — DISCHARGE INSTRUCTIONS
Rest today. Avoid lifting or straining. Do not drive for 24 hours. Gradually resume your normal activities.    Gradually resume your normal diet.    You may shower or bathe in 24 hours.    Call the doctor if:    -There is bleeding/swelling at the biopsy site  -You feel weak or lightheaded  -Worsening pain  -Shortness of breath or chest pain    If you have any problems or questions our phone numbers are:    [facility]-(normal working hours)- [phone]    After hours-ask for the Radiology Resident on call at [phone]

## 2017-09-20 NOTE — DISCHARGE SUMMARY
Radiology Discharge Summary      Hospital Course: No complications    Admit Date: 9/20/2017  Discharge Date: 09/20/2017     Instructions Given to Patient: Yes  Diet: Resume prior diet  Activity: activity as tolerated    Description of Condition on Discharge: Stable  Vital Signs (Most Recent): Temp: 98.4 °F (36.9 °C) (09/20/17 0928)  Pulse: 63 (09/20/17 1055)  Resp: 18 (09/20/17 1055)  BP: 133/77 (09/20/17 1055)  SpO2: 100 % (09/20/17 1055)    Discharge Disposition: Home    Discharge Diagnosis: 1Mediastinal/left lung lung mass status post biopsy. [id number]     Follow-up: Dr. Kit Avendano Ma  Radiology

## 2017-09-22 ENCOUNTER — TELEPHONE (OUTPATIENT)
Dept: PULMONOLOGY | Facility: CLINIC | Age: 57
End: 2017-09-22

## 2017-09-22 DIAGNOSIS — R91.1 PULMONARY NODULE: Primary | ICD-10-CM

## 2017-09-22 NOTE — TELEPHONE ENCOUNTER
I called patient and let her know her results of her biopsy did not show neoplasia or evidence of carcinoma but did show fibrosis and necrotic tissue. However, we are going to continue to monitor and get a follow up PET CT in 3 months.     Patient verbalized understanding and was satisfied with this encounter.

## 2017-09-25 ENCOUNTER — PATIENT MESSAGE (OUTPATIENT)
Dept: PULMONOLOGY | Facility: CLINIC | Age: 57
End: 2017-09-25

## 2017-09-25 LAB — BACTERIA SPEC AEROBE CULT: NO GROWTH

## 2017-09-27 LAB — BACTERIA SPEC ANAEROBE CULT: NORMAL

## 2017-09-28 ENCOUNTER — HOSPITAL ENCOUNTER (OUTPATIENT)
Dept: PULMONOLOGY | Facility: CLINIC | Age: 57
Discharge: HOME | End: 2017-09-28
Payer: COMMERCIAL

## 2017-09-28 DIAGNOSIS — J44.9 CHRONIC OBSTRUCTIVE PULMONARY DISEASE, UNSPECIFIED COPD TYPE: ICD-10-CM

## 2017-09-28 LAB
POST FEV1 FVC: 0.46
POST FEV1: 0.82
POST FVC: 1.77
PRE FEV1 FVC: 44
PRE FEV1: 0.72
PRE FVC: 1.64
PREDICTED FEV1 FVC: 83
PREDICTED FEV1: 2.26
PREDICTED FVC: 2.76

## 2017-09-28 PROCEDURE — 94729 DIFFUSING CAPACITY: CPT | Mod: S$GLB,,, | Performed by: INTERNAL MEDICINE

## 2017-09-28 PROCEDURE — 94060 EVALUATION OF WHEEZING: CPT | Mod: S$GLB,,, | Performed by: INTERNAL MEDICINE

## 2017-09-28 PROCEDURE — 94727 GAS DIL/WSHOT DETER LNG VOL: CPT | Mod: 51,S$GLB,, | Performed by: INTERNAL MEDICINE

## 2017-10-04 ENCOUNTER — OFFICE VISIT (OUTPATIENT)
Dept: INTERNAL MEDICINE | Facility: CLINIC | Age: 57
End: 2017-10-04
Payer: COMMERCIAL

## 2017-10-04 ENCOUNTER — LAB VISIT (OUTPATIENT)
Dept: LAB | Facility: HOSPITAL | Age: 57
End: 2017-10-04
Attending: INTERNAL MEDICINE
Payer: COMMERCIAL

## 2017-10-04 VITALS
SYSTOLIC BLOOD PRESSURE: 104 MMHG | HEART RATE: 78 BPM | HEIGHT: 63 IN | WEIGHT: 155.63 LBS | DIASTOLIC BLOOD PRESSURE: 72 MMHG | BODY MASS INDEX: 27.57 KG/M2

## 2017-10-04 DIAGNOSIS — R91.1 NODULE OF LEFT LUNG: ICD-10-CM

## 2017-10-04 DIAGNOSIS — J44.9 CHRONIC OBSTRUCTIVE PULMONARY DISEASE, UNSPECIFIED COPD TYPE: ICD-10-CM

## 2017-10-04 DIAGNOSIS — Z80.0 FAMILY HISTORY OF COLON CANCER IN FATHER: ICD-10-CM

## 2017-10-04 DIAGNOSIS — Z11.59 NEED FOR HEPATITIS C SCREENING TEST: ICD-10-CM

## 2017-10-04 DIAGNOSIS — I10 ESSENTIAL HYPERTENSION: ICD-10-CM

## 2017-10-04 DIAGNOSIS — Z12.31 ENCOUNTER FOR SCREENING MAMMOGRAM FOR BREAST CANCER: ICD-10-CM

## 2017-10-04 DIAGNOSIS — Z00.00 ANNUAL PHYSICAL EXAM: Primary | ICD-10-CM

## 2017-10-04 LAB
ANION GAP SERPL CALC-SCNC: 13 MMOL/L
BUN SERPL-MCNC: 19 MG/DL
CALCIUM SERPL-MCNC: 10 MG/DL
CHLORIDE SERPL-SCNC: 95 MMOL/L
CO2 SERPL-SCNC: 29 MMOL/L
CREAT SERPL-MCNC: 1.1 MG/DL
EST. GFR  (AFRICAN AMERICAN): >60 ML/MIN/1.73 M^2
EST. GFR  (NON AFRICAN AMERICAN): 56.3 ML/MIN/1.73 M^2
GLUCOSE SERPL-MCNC: 109 MG/DL
MAGNESIUM SERPL-MCNC: 1.5 MG/DL
POTASSIUM SERPL-SCNC: 4.3 MMOL/L
SODIUM SERPL-SCNC: 137 MMOL/L

## 2017-10-04 PROCEDURE — 80048 BASIC METABOLIC PNL TOTAL CA: CPT

## 2017-10-04 PROCEDURE — 99396 PREV VISIT EST AGE 40-64: CPT | Mod: S$GLB,,, | Performed by: INTERNAL MEDICINE

## 2017-10-04 PROCEDURE — 86803 HEPATITIS C AB TEST: CPT

## 2017-10-04 PROCEDURE — 36415 COLL VENOUS BLD VENIPUNCTURE: CPT

## 2017-10-04 PROCEDURE — 83735 ASSAY OF MAGNESIUM: CPT

## 2017-10-04 PROCEDURE — 99999 PR PBB SHADOW E&M-EST. PATIENT-LVL III: CPT | Mod: PBBFAC,,, | Performed by: INTERNAL MEDICINE

## 2017-10-04 RX ORDER — VALSARTAN 160 MG/1
160 TABLET ORAL DAILY
Qty: 90 TABLET | Refills: 3 | Status: SHIPPED | OUTPATIENT
Start: 2017-10-04 | End: 2018-11-19 | Stop reason: SDUPTHER

## 2017-10-04 NOTE — PROGRESS NOTES
Subjective:       Patient ID: Diana Sellers is a 57 y.o. female.    Chief Complaint: Establish Care    HPI    First visit with me, seen in last year by Internal Medicine and Pulmonary. 3 weeks ago underwent left lung biopsy. Has upcoming appointment with Pulmonary.     Using Lasix about 2x/week for edema, mostly for pulmonary edema.  Reports use helps control her shortness of breath symptoms.  Maintains mild bilateral leg swelling, but no changes recently.  Breathing overall is stable, keeps her albuterol inhaler with her but does not need to use very regularly.    Denies any depression or anxiety.  She does develop panic attacks when driving over bridges, but this is controlled by avoidance.    Reviewed PMH, PSH, SH, FH, allergies, and medications.     Review of Systems   Constitutional: Negative for activity change and unexpected weight change.   Eyes: Negative for discharge.   Respiratory: Negative for wheezing.    Cardiovascular: Negative for chest pain and palpitations.   Gastrointestinal: Negative for constipation, diarrhea and vomiting.   Genitourinary: Negative for difficulty urinating and hematuria.   Neurological: Negative for headaches.   Psychiatric/Behavioral: Negative for dysphoric mood.       Objective:      Physical Exam   Constitutional: She is oriented to person, place, and time. No distress.   HENT:   Head: Atraumatic.   Right Ear: Tympanic membrane normal.   Left Ear: Tympanic membrane normal.   Mouth/Throat: Oropharynx is clear and moist. No oropharyngeal exudate.   Eyes: Pupils are equal, round, and reactive to light. Right eye exhibits no discharge. Left eye exhibits no discharge.   Neck: Normal range of motion. No thyromegaly present.   Cardiovascular: Normal rate, regular rhythm and normal heart sounds.    Pulmonary/Chest: Effort normal. No stridor. She has decreased breath sounds (mildly distant breath sounds throughout). She has no wheezes. She has no rhonchi. She has no rales.  "  Abdominal: Soft. She exhibits no distension and no mass. There is no hepatosplenomegaly. There is no tenderness. There is no guarding and negative Lock's sign.   Musculoskeletal: She exhibits no edema or tenderness.   Lymphadenopathy:     She has no cervical adenopathy.   Neurological: She is alert and oriented to person, place, and time.   Skin: Skin is warm and dry. No rash noted.   Psychiatric: She has a normal mood and affect. Her behavior is normal.   Nursing note and vitals reviewed.      Vitals:    10/04/17 1514   BP: 104/72   BP Location: Right arm   Patient Position: Sitting   BP Method: Large (Manual)   Pulse: 78   Weight: 70.6 kg (155 lb 10.3 oz)   Height: 5' 3" (1.6 m)     Body mass index is 27.57 kg/m².    RESULTS: Reviewed labs from last 18 months    The 10-year ASCVD risk score (Oliver RODRIGUEZ Jr., et al., 2013) is: 1.3%    Values used to calculate the score:      Age: 57 years      Sex: Female      Is Non- : No      Diabetic: No      Tobacco smoker: No      Systolic Blood Pressure: 104 mmHg      Is BP treated: Yes      HDL Cholesterol: 93 mg/dL      Total Cholesterol: 219 mg/dL     Assessment:       1. Annual physical exam    2. Encounter for screening mammogram for breast cancer    3. Need for hepatitis C screening test    4. Essential hypertension    5. Nodule of left lung    6. Family history of colon cancer in father    7. Chronic obstructive pulmonary disease, unspecified COPD type        Plan:   Diana was seen today for establish care.    Diagnoses and all orders for this visit:    Annual physical exam:  Age-appropriate health screening reviewed, indicated tests ordered.     Encounter for screening mammogram for breast cancer  -     Mammo Digital Screening Bilat with CAD; Future    Need for hepatitis C screening test  -     Hepatitis C antibody; Future    Essential hypertension:  Prior diagnosis, well controlled on current management. No changes at this time, will " continue to monitor.   -     Basic metabolic panel; Future  -     Magnesium; Future  -     valsartan (DIOVAN) 160 MG tablet; Take 1 tablet (160 mg total) by mouth once daily.    Nodule of left lung:  Bx was negative, plan by Pulmonary for PET stress, continue follow up with Pulmonary.    Family history of colon cancer in father:  colonoscopy every 5 years    COPD:  generally well controlled on current management, continue follow up with Pulmonary, reviewed results of recent PFTs with the patient.    Return in about 6 months (around 4/4/2018) for fasting labs 1 week prior.  Dinesh Lou MD  Internal Medicine    Portions of this note were completed using [employer] dictation software. Please excuse typographical or syntax errors.

## 2017-10-05 ENCOUNTER — PATIENT MESSAGE (OUTPATIENT)
Dept: INTERNAL MEDICINE | Facility: CLINIC | Age: 57
End: 2017-10-05

## 2017-10-05 DIAGNOSIS — E83.42 HYPOMAGNESEMIA: ICD-10-CM

## 2017-10-05 DIAGNOSIS — E55.9 VITAMIN D INSUFFICIENCY: ICD-10-CM

## 2017-10-05 DIAGNOSIS — I10 ESSENTIAL HYPERTENSION: Primary | ICD-10-CM

## 2017-10-05 LAB — HCV AB SERPL QL IA: NEGATIVE

## 2017-10-05 RX ORDER — TALC
250 POWDER (GRAM) TOPICAL DAILY PRN
COMMUNITY
End: 2019-01-24

## 2017-10-07 ENCOUNTER — PATIENT MESSAGE (OUTPATIENT)
Dept: PULMONOLOGY | Facility: CLINIC | Age: 57
End: 2017-10-07

## 2017-10-12 DIAGNOSIS — G25.0 ESSENTIAL TREMOR: ICD-10-CM

## 2017-10-12 RX ORDER — PROPRANOLOL HYDROCHLORIDE 10 MG/1
10 TABLET ORAL EVERY 6 HOURS PRN
Qty: 90 TABLET | Refills: 11 | Status: SHIPPED | OUTPATIENT
Start: 2017-10-12 | End: 2018-11-20

## 2017-10-19 ENCOUNTER — TELEPHONE (OUTPATIENT)
Dept: PULMONOLOGY | Facility: CLINIC | Age: 57
End: 2017-10-19

## 2017-10-19 NOTE — TELEPHONE ENCOUNTER
Spoke to patient.  New 2 cm pulmonary nodule, biopsy of which is necrotic.  No granuloma.  No infectious symptoms.  Cultures negative thus far.  Will obtain PET/CT November 2 which is two months from original scan and 6 weeks from biopsy. Cultures should be resulted by then.  Will see her at 1:30 on the 2.  Lima

## 2017-10-19 NOTE — TELEPHONE ENCOUNTER
----- Message from Danielle Palomo NP sent at 10/19/2017  7:44 AM CDT -----  Contact: Self [phone]  Hi Dr. Lopes    You consulted/cosigned the initial encounter. The patient wanted pulmonologist back up and she knew I consulted with you. I gather she is still unsure about her results (negative neoplasia) and wants to hear from you.    Thanks   Danielle  ----- Message -----  From: Lima Lopes MD  Sent: 10/18/2017   5:19 PM  To: AMRIT Muniz,  Have I met this patient?  I am unclear why I am involved.  Can't find my name anywhere.  Lima    ----- Message -----  From: Sybil May MA  Sent: 10/18/2017  11:12 AM  To: MD Dr Moses Barakat,  This patient would like to discuss her test results with you.   Thank you, Sybil  ----- Message -----  From: Danielle Palomo NP  Sent: 10/18/2017   8:41 AM  To: Sybil May MA    See if patient wants to specifically speak to Dr. Lopes or if she is ok with speaking to me.     Thanks  Danielle  ----- Message -----  From: Sybil May MA  Sent: 10/17/2017  12:53 PM  To: AMRIT Muniz, It looks like you've been seeing this patient.  Thank you, Sybil    ----- Message -----  From: Meeta Jaimes  Sent: 10/17/2017  12:11 PM  To: Moses MICHELLE Staff    PT wants to discuss test results with Dr. Lopes.

## 2017-10-20 ENCOUNTER — HOSPITAL ENCOUNTER (OUTPATIENT)
Dept: RADIOLOGY | Facility: HOSPITAL | Age: 57
Discharge: HOME | End: 2017-10-20
Attending: INTERNAL MEDICINE
Payer: COMMERCIAL

## 2017-10-20 VITALS — BODY MASS INDEX: 27.46 KG/M2 | HEIGHT: 63 IN | WEIGHT: 155 LBS

## 2017-10-20 DIAGNOSIS — Z12.31 ENCOUNTER FOR SCREENING MAMMOGRAM FOR BREAST CANCER: ICD-10-CM

## 2017-10-20 PROCEDURE — 77067 SCR MAMMO BI INCL CAD: CPT | Mod: 26,,, | Performed by: RADIOLOGY

## 2017-10-20 PROCEDURE — 77063 BREAST TOMOSYNTHESIS BI: CPT | Mod: 26,,, | Performed by: RADIOLOGY

## 2017-10-20 PROCEDURE — 77067 SCR MAMMO BI INCL CAD: CPT | Mod: TC

## 2017-10-24 LAB — FUNGUS SPEC CULT: NORMAL

## 2017-11-02 ENCOUNTER — HOSPITAL ENCOUNTER (OUTPATIENT)
Dept: RADIOLOGY | Facility: HOSPITAL | Age: 57
Discharge: HOME | End: 2017-11-02
Attending: NURSE PRACTITIONER
Payer: COMMERCIAL

## 2017-11-02 ENCOUNTER — OFFICE VISIT (OUTPATIENT)
Dept: PULMONOLOGY | Facility: CLINIC | Age: 57
End: 2017-11-02
Payer: COMMERCIAL

## 2017-11-02 VITALS
BODY MASS INDEX: 28.9 KG/M2 | WEIGHT: 163.13 LBS | HEIGHT: 63 IN | HEART RATE: 78 BPM | OXYGEN SATURATION: 97 % | SYSTOLIC BLOOD PRESSURE: 126 MMHG | DIASTOLIC BLOOD PRESSURE: 84 MMHG

## 2017-11-02 DIAGNOSIS — R91.1 PULMONARY NODULE, LEFT: Primary | ICD-10-CM

## 2017-11-02 DIAGNOSIS — R91.1 PULMONARY NODULE: ICD-10-CM

## 2017-11-02 LAB — POCT GLUCOSE: 105 MG/DL (ref 70–110)

## 2017-11-02 PROCEDURE — A9552 F18 FDG: HCPCS

## 2017-11-02 PROCEDURE — 99999 PR PBB SHADOW E&M-EST. PATIENT-LVL III: CPT | Mod: PBBFAC,,, | Performed by: INTERNAL MEDICINE

## 2017-11-02 PROCEDURE — 78815 PET IMAGE W/CT SKULL-THIGH: CPT | Mod: 26,PI,, | Performed by: NUCLEAR MEDICINE

## 2017-11-02 PROCEDURE — 99213 OFFICE O/P EST LOW 20 MIN: CPT | Mod: S$GLB,,, | Performed by: INTERNAL MEDICINE

## 2017-11-02 NOTE — PROGRESS NOTES
"Subjective:       Patient ID: Diana Sellers is a 57 y.o. female.    Chief Complaint: Lung Mass    57 year old former smoker who had an abnormal CT and a left pulmonary nodule that was new from 10/2015.  Although PFTs show significant obstruction, patient is extremely functional.  Uses spriva and advair regurlarly.  Rarely requires albuterol.  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  Cultures are negative.      Review of Systems    Objective:       Vitals:    11/02/17 1335   BP: 126/84   BP Location: Right arm   Patient Position: Sitting   Pulse: 78   SpO2: 97%   Weight: 74 kg (163 lb 2.3 oz)   Height: 5' 3" (1.6 m)     Physical Exam   Constitutional: She is oriented to person, place, and time. She appears well-developed and well-nourished.   Cardiovascular: Normal rate and regular rhythm.    Pulmonary/Chest: Normal expansion and hyperinflation. She has no wheezes. She exhibits no tenderness.   Musculoskeletal: Normal range of motion. She exhibits no edema.   Neurological: She is alert and oriented to person, place, and time. No cranial nerve deficit.   Skin: Skin is warm and dry.   Psychiatric: She has a normal mood and affect.     Personal Diagnostic Review  PET/CT with suv max of 1.9. Does not appear to have growth but again new from 2015.  No flowsheet data found.      Assessment:       1. Pulmonary nodule, left        Outpatient Encounter Prescriptions as of 11/2/2017   Medication Sig Dispense Refill    albuterol (VENTOLIN HFA) 90 mcg/actuation inhaler Inhale 2 puffs into the lungs every 6 hours as needed for wheezing. 18 g 11    albuterol-ipratropium 2.5mg-0.5mg/3mL (DUO-NEB) 0.5 mg-3 mg(2.5 mg base)/3 mL nebulizer solution Take 3 mLs by nebulization every 6 (six) hours as needed. 90 vial 3    fluticasone-salmeterol 500-50 mcg/dose (ADVAIR DISKUS) 500-50 mcg/dose DsDv diskus inhaler Inhale 1 puff into the lungs 2 (two) times daily. Controller 60 each 11    furosemide (LASIX) " 40 MG tablet Take 1 tablet (40 mg total) by mouth daily as needed (edema). 30 tablet 11    inhalation device (AEROCHAMBER PLUS FLOW-VU) Use as directed for inhalation. 1 Device 0    magnesium oxide (MAG-OX) 250 mg Tab Take 250 mg by mouth daily as needed.      montelukast (SINGULAIR) 10 mg tablet Take 1 tablet (10 mg total) by mouth once daily. 30 tablet 11    propranolol (INDERAL) 10 MG tablet Take 1 tablet (10 mg total) by mouth every 6 (six) hours as needed (tremors). 90 tablet 11    valsartan (DIOVAN) 160 MG tablet Take 1 tablet (160 mg total) by mouth once daily. 90 tablet 3    tiotropium (SPIRIVA) 18 mcg inhalation capsule Inhale 1 capsule (18 mcg total) into the lungs once daily. 90 capsule 3     No facility-administered encounter medications on file as of 11/2/2017.      Orders Placed This Encounter   Procedures    Ambulatory consult to Thoracic Surgery\     Referral Priority:   Routine     Referral Type:   Consultation     Referral Reason:   Specialty Services Required     Number of Visits Requested:   1     Plan:       Former smoker with new pulmonary nodule. Biopsy non-diagnostic and minimal hypermetabolic activity.  Will discuss in multi-D conference.  Defer to thoracic surgery.

## 2017-11-06 ENCOUNTER — TELEPHONE (OUTPATIENT)
Dept: CARDIOTHORACIC SURGERY | Facility: CLINIC | Age: 57
End: 2017-11-06

## 2017-11-06 NOTE — TELEPHONE ENCOUNTER
Called pt in referral from Dr. Lopes with diagnosis of left pulmonary nodule. Case to be presented on Wednesday during the multi-disciplinary tumor board conference.  Currently employed at [employer] as a .  Appointment scheduled for Friday 11/10 with Dr. Ramirez.

## 2017-11-08 ENCOUNTER — DOCUMENTATION ONLY (OUTPATIENT)
Dept: CARDIOTHORACIC SURGERY | Facility: CLINIC | Age: 57
End: 2017-11-08

## 2017-11-08 NOTE — PATIENT CARE CONFERENCE
[facility]      THORACIC MULTIDISCIPLINARY TUMOR BOARD  PATIENT REVIEW FORM  ________________________________________________________________________    CLINIC #: [id number]  DATE: 11/08/2017    TUMOR SITE:   Pulmonary Nodule    PRESENTER:   Dr. Lopes    PATIENT SUMMARY:   56 y/o former smoker who had an abnormal CT and a left pulmonary nodule that was new from 10/2015. CT chest on 9/5/17 revealed 2.2 x 1.3 cm soft tissue nodule in the medial aspect of the anterior segment of the left upper lobe concerning for primary lung neoplasm. Prompt consultation with pulmonology is recommended. Consider PET CT and soft tissue sampling.    QUOC IR biopsy on 9/20/17 revealed NO NEOPLASIA IDENTIFIED NECROTIC TISSUE WITH FIBROSIS.    PET on 11/2/17 revealed There is a solid pulmonary nodule along the medial aspect of the anterior segment of the left upper lobe demonstrating no significant metabolic activity with SUV max 1.9.  Incidental CT findings:  There are centrilobular emphysematous changes with upper lobe predominance. The osseous structures show age-appropriate degenerative changes. No other significant CT abnormalities are identified.    BOARD RECOMMENDATIONS:   Refer to thoracic surgery for excisional biopsy.     CONSULT NEEDED:     [x] Surgery    [] Hem/Onc    [] Rad/Onc    [] Dietary                 [] Social Service    [] Psychology       [] Pulmonology    Clinical Stage: Tumor  Node(s)  Metastasis   Pathologic Stage: Tumor  Node(s)  Metastasis       GROUP STAGE:     [] O    [] 1A    [] IB    [] IIA    [] IIB     [] IIIA     [] IIIB     [] IIIC    [] IV                               [] Local recurrence     [] Regional recurrence     [] Distant recurrence                   [] NSCLC     [] SCLC     Tumor type     Unstageable:      [] Yes     [] No  Metastatic site(s):          [x] Kaylee'[facility] Treatment Guidelines reviewed and care planned is consistent with guidelines.         (i.e., NCCN, NCI, PD, ACO, AUA,  etc.)    PRESENTATION AT CANCER CONFERENCE:         [] Prospective    [] Retrospective     [] Follow-Up          [] Eligible for clinical trial

## 2017-11-10 ENCOUNTER — OFFICE VISIT (OUTPATIENT)
Dept: CARDIOTHORACIC SURGERY | Facility: CLINIC | Age: 57
End: 2017-11-10
Payer: COMMERCIAL

## 2017-11-10 VITALS
SYSTOLIC BLOOD PRESSURE: 131 MMHG | OXYGEN SATURATION: 97 % | BODY MASS INDEX: 28.39 KG/M2 | DIASTOLIC BLOOD PRESSURE: 84 MMHG | WEIGHT: 160.25 LBS | HEART RATE: 70 BPM | HEIGHT: 63 IN

## 2017-11-10 DIAGNOSIS — R91.8 LUNG MASS: Primary | ICD-10-CM

## 2017-11-10 PROCEDURE — 99204 OFFICE O/P NEW MOD 45 MIN: CPT | Mod: S$GLB,,, | Performed by: THORACIC SURGERY (CARDIOTHORACIC VASCULAR SURGERY)

## 2017-11-10 PROCEDURE — 99999 PR PBB SHADOW E&M-EST. PATIENT-LVL IV: CPT | Mod: PBBFAC,,, | Performed by: THORACIC SURGERY (CARDIOTHORACIC VASCULAR SURGERY)

## 2017-11-10 NOTE — PROGRESS NOTES
History & Physical    SUBJECTIVE:     History of Present Illness:    58 y/o female former smoker with COPD, HTN, and incidentally found QUOC pulmonary nodule versus mediastinal mass. Incidentally found on CXR which prompted Chest CT which revealed a 2.2 x 1.3 cm soft tissue nodule in the medial aspect of the anterior segment of the left upper lobe. IR biopsy - no neoplasia, necrotic tissue with fibrosis. PET with minimal SUV max 1.9. She is active at work but requires inhaler prn in addition to controller meds. Occasional chest tightness and wheezing contributed to COPD. Takes lasix 2-3 times per week for pulmonary edema which helps with SOB. Follows with Dr. Lopes. She denies fever, chills, CP, nausea, vomiting, weight changes.      Former smoker. Quit 3 years ago. 35 pack years  PSH: DNC, shoulder biopsy.   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       Chief Complaint   Patient presents with    Consult       Review of patient's allergies indicates:   Allergen Reactions    Ace inhibitors        Current Outpatient Prescriptions   Medication Sig Dispense Refill    albuterol (VENTOLIN HFA) 90 mcg/actuation inhaler Inhale 2 puffs into the lungs every 6 hours as needed for wheezing. 18 g 11    albuterol-ipratropium 2.5mg-0.5mg/3mL (DUO-NEB) 0.5 mg-3 mg(2.5 mg base)/3 mL nebulizer solution Take 3 mLs by nebulization every 6 (six) hours as needed. 90 vial 3    fluticasone-salmeterol 500-50 mcg/dose (ADVAIR DISKUS) 500-50 mcg/dose DsDv diskus inhaler Inhale 1 puff into the lungs 2 (two) times daily. Controller 60 each 11    furosemide (LASIX) 40 MG tablet Take 1 tablet (40 mg total) by mouth daily as needed (edema). 30 tablet 11    inhalation device (AEROCHAMBER PLUS FLOW-VU) Use as directed for inhalation. 1 Device 0    magnesium oxide (MAG-OX) 250 mg Tab Take 250 mg by mouth daily as needed.      montelukast (SINGULAIR) 10 mg tablet Take 1 tablet (10 mg total) by mouth once daily. 30 tablet 11    propranolol  (INDERAL) 10 MG tablet Take 1 tablet (10 mg total) by mouth every 6 (six) hours as needed (tremors). 90 tablet 11    valsartan (DIOVAN) 160 MG tablet Take 1 tablet (160 mg total) by mouth once daily. 90 tablet 3    tiotropium (SPIRIVA) 18 mcg inhalation capsule Inhale 1 capsule (18 mcg total) into the lungs once daily. 90 capsule 3     No current facility-administered medications for this visit.        Past Medical History:   Diagnosis Date    Allergy     Anxiety     COPD (chronic obstructive pulmonary disease)     Fractures 2007    History rib fractures with coughing, last in 2007.    History of vitamin D deficiency     mild in past, treated with 50k units Vit D3 weekly, no longer on treatment    Hypertension      Past Surgical History:   Procedure Laterality Date    BONE BIOPSY Right     shoulder    COLONOSCOPY  late 90s?    [facility]    DILATION AND CURETTAGE OF UTERUS  1980    WISDOM TOOTH EXTRACTION      1981     Family History   Problem Relation Age of Onset    Hypertension Mother     Diabetes Mother     Colon cancer Father     Hypertension Father     Retinal detachment Father     Cancer Father      colon    Spina bifida Brother     Amblyopia Neg Hx     Blindness Neg Hx     Cataracts Neg Hx     Glaucoma Neg Hx     Macular degeneration Neg Hx     Strabismus Neg Hx      Social History   Substance Use Topics    Smoking status: Former Smoker     Packs/day: 1.00     Years: 35.00     Types: Cigarettes     Quit date: 1/1/2014    Smokeless tobacco: Never Used      Comment: she quit!    Alcohol use 1.8 oz/week     3 Glasses of wine per week      Comment: 3 glass wine/day        Review of Systems:  Review of Systems   Constitutional: Negative for activity change, chills, fatigue, fever and unexpected weight change.   Respiratory: Positive for cough, shortness of breath and wheezing.    Cardiovascular: Negative for chest pain, palpitations and leg swelling.  "  Gastrointestinal: Negative for abdominal pain, nausea and vomiting.   Genitourinary: Negative for difficulty urinating.   Skin: Negative for color change and rash.   Neurological: Negative for dizziness and syncope.   Psychiatric/Behavioral: Negative for agitation. The patient is not nervous/anxious.        OBJECTIVE:     Vital Signs (Most Recent)  Pulse: 70 (11/10/17 0846)  BP: 131/84 (11/10/17 0846)  SpO2: 97 % (11/10/17 0846)  5' 3" (1.6 m)  72.7 kg (160 lb 4.4 oz)     Physical Exam:  Physical Exam   Constitutional: She is oriented to person, place, and time. She appears well-developed and well-nourished.   HENT:   Head: Normocephalic and atraumatic.   Eyes: EOM are normal.   Neck: Normal range of motion. Neck supple.   Cardiovascular: Normal rate, regular rhythm and normal heart sounds.    Pulmonary/Chest: Effort normal. She has decreased breath sounds (decreased air entry bilaterally). She has no wheezes.   Abdominal: Soft.   Musculoskeletal: Normal range of motion.   Neurological: She is alert and oriented to person, place, and time.   Skin: Skin is warm and dry.   Psychiatric: She has a normal mood and affect. Thought content normal.   Vitals reviewed.      Diagnostic Results:    2D echo 1/24/17:   CONCLUSIONS     1 - Normal left ventricular systolic function (EF 60-65%).     2 - Normal left ventricular diastolic function.     3 - Normal right ventricular systolic function .     4 - The estimated PA systolic pressure is 40 mmHg.     5 - Mild tricuspid regurgitation.     Chest CT 9/5/17:  2.2 x 1.3 cm soft tissue nodule in the medial aspect of the anterior segment of the left upper lobe concerning for primary lung neoplasm. Prompt consultation with pulmonology is recommended. Consider PET CT and soft tissue sampling.  Centrilobular emphysema.    IR biopsy 9/20/17:  NO NEOPLASIA IDENTIFIED  NECROTIC TISSUE WITH FIBROSIS  One part of the fibrous tissue is  vaguely reminiscent of nerve sheath tumor.    PET " 11/2/17:   Left upper lobe solid pulmonary nodule demonstrating no significant metabolic activity, favored to represent a benign process.    ASSESSMENT/PLAN:     58 y/o female former smoker with COPD, HTN, and incidentally found QUOC pulmonary nodule verus mediastinal mass - unclear where mass is arising from.     PLAN:Plan     2D echo reviewed.   Proceed to OR for left VATS for resection of lung mass versus mediastinal mass, possible thoracotomy on 11/30/17.   Appropriate patient education regarding the deric-operative period as well as intraperative details were discussed. Risks, including but not limited to, bleeding, infection, pain and anesthetic complication were discussed. Patient was given the opportunity to ask questions and to have those questions answered to their satisfaction. Patient verbalized understanding to both procedure and associated risks. Consent was obtained.    ATTENDING ATTESTATION:    I evaluated the patient and I agree with the assessment and plan.  I recommend an exploratory left VATS and mass resection.  I have discussed the technical aspects, risks and benefits of the procedure with the patient.  I did inform the patient that the risks are the most common risks and that there are other less likely risks that are too numerous to elaborate.  The patient is aware and has agreed to undergo the procedure as detailed on the consent form.

## 2017-11-10 NOTE — LETTER
[facility]  [address]  [place] [address]  Phone: [phone]  Fax: [phone] November 10, 2017      Lima Lopes MD  [address]  [place] [address]    Patient: Diana Sellers   MR Number: [id number]   YOB: 1960   Date of Visit: 11/10/2017     Dear Dr. Lopes:    Thank you for referring Diana Sellers to me for evaluation.  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      I recommend an exploratory left VATS resection. I reviewed the pertinent radiographs and discussed the surgical approach with Ms. Sellers during her office visit today.     If you have questions, please do not hesitate to call me. I look forward to following Diana along with you.    Sincerely,        Dinesh Ramirez MD   Section of Thoracic Surgery  [facility] [place], [place]    BLP/hcr    CC  Dinesh Lou MD

## 2017-11-13 ENCOUNTER — OFFICE VISIT (OUTPATIENT)
Dept: OPTOMETRY | Facility: CLINIC | Age: 57
End: 2017-11-13
Payer: COMMERCIAL

## 2017-11-13 DIAGNOSIS — H52.4 PRESBYOPIA: ICD-10-CM

## 2017-11-13 DIAGNOSIS — I10 ESSENTIAL HYPERTENSION: ICD-10-CM

## 2017-11-13 DIAGNOSIS — Z46.0 FITTING AND ADJUSTMENT OF SPECTACLES AND CONTACT LENSES: Primary | ICD-10-CM

## 2017-11-13 DIAGNOSIS — H52.13 MYOPIA, BILATERAL: Primary | ICD-10-CM

## 2017-11-13 DIAGNOSIS — D31.31 CHOROIDAL NEVUS OF RIGHT EYE: ICD-10-CM

## 2017-11-13 DIAGNOSIS — Z46.0 FITTING AND ADJUSTMENT OF SPECTACLES AND CONTACT LENSES: ICD-10-CM

## 2017-11-13 PROCEDURE — 92014 COMPRE OPH EXAM EST PT 1/>: CPT | Mod: S$GLB,,, | Performed by: OPTOMETRIST

## 2017-11-13 PROCEDURE — 99999 PR PBB SHADOW E&M-EST. PATIENT-LVL II: CPT | Mod: PBBFAC,,, | Performed by: OPTOMETRIST

## 2017-11-13 PROCEDURE — 92015 DETERMINE REFRACTIVE STATE: CPT | Mod: S$GLB,,, | Performed by: OPTOMETRIST

## 2017-11-13 PROCEDURE — 92310 CONTACT LENS FITTING OU: CPT | Mod: ,,, | Performed by: OPTOMETRIST

## 2017-11-13 NOTE — PROGRESS NOTES
HPI     Patient's age: 57 y.o.    Approximate date of last eye examination:  12/30/15  Name of last eye doctor seen: Dr. Hinton    Pt states that she is here for eye exam need to get new contacts    Wears glasses? Yes, didn't bring      Wears CLs?:  Yes, [employer] MF             Headaches?  no  Eye pain/discomfort?  no                                                                                     Flashes?  no  Floaters?  no  Diplopia/Double vision?  no    Patient's Ocular History:         Any eye surgeries? no        Family history of eye disease?  no    Significant patient medical history:         1. Diabetes?  no       If yes, IDDM or NIDDM? nono   2. HBP?  yes                 ! OTC eyedrops currently using:  none   ! Prescription eye meds currently using:  none         Last edited by Tricia King MA on 11/13/2017  9:05 AM. (History)            Assessment /Plan     For exam results, see Encounter Report.    Myopia, bilateral  Presbyopia   Rx specs   Fitting and adjustment of spectacles and contact lenses   Dispensed trials, changed to -5.00 DS OU +2.00 D OU   Remove nightly, replace monthly   OK to order if happy    Essential hypertension   No retinopathy, monitor yearly    Choroidal nevus of right eye      RTC 1 year, sooner PRN

## 2017-11-15 ENCOUNTER — ANESTHESIA EVENT (OUTPATIENT)
Dept: SURGERY | Facility: HOSPITAL | Age: 57
DRG: 165 | End: 2017-11-15
Payer: COMMERCIAL

## 2017-11-15 DIAGNOSIS — Z01.818 PREOP TESTING: Primary | ICD-10-CM

## 2017-11-15 NOTE — ANESTHESIA PREPROCEDURE EVALUATION
"  Anesthesia Assessment: Preoperative EQUATION    Planned Procedure: Procedure(s) (LRB):  THORACOSCOPY-VIDEO ASSISTED (VATS) (Left)  Requested Anesthesia Type:General  Surgeon: Dinesh Ramirez MD  Service: Thoracic  Known or anticipated Date of Surgery:11/30/2017    Surgeon notes: reviewed and Lung Mass    Last PCP note: within 3 months , within [facility] , Annual Exam    Tests already available:  Results have been reviewed.Labs-10/4/17-BMP/Hep C antibody/Magnesium/ 9/20/17-CBC/PT-INR/Alpha-1-antitrypsin Phenotype/ CXR-9/20/17/2DEcho-1/24/17                Plan:    Testing:  T&S   Pre-anesthesia  visit       Visit focus: concerns in complex and/or prolonged anesthesia, airway concerns     Consultation:Patient's PCP for a statement of optimization  Dr. CRYSTAL Lou-In-Basket message sent  requesting "clearance" statement-Kim to send message on 11/15/17       Navigation: Tests Scheduled. Lab-T&S on 11/28/17 @ 12:30p             Consults scheduled.POC on 11/28/17 @ 1p & Patient's PCP for a statement of optimization:  Dr. CRYSTAL Lou-In-Basket message sent  requesting "clearance" statement-Kim to send message on 11/15/17             Results will be tracked by Preop Clinic.                                                 Elena River RN  11/15/17     Addendum: Per PCP--will send pt. For Stress Test prior to sx , before he can "Clear" pt. For [id number]. On 11/27/17 @ 7:30a.  Elena River RN  11/21/17                                                                                                                        11/15/2017  Diana Sellers is a 57 y.o., female.    Anesthesia Evaluation    I have reviewed the Patient Summary Reports.     I have reviewed the Nursing Notes.   I have reviewed the Medications.     Review of Systems  Anesthesia Hx:  No problems with previous Anesthesia  History of prior surgery of interest to airway management or planning: Previous anesthesia: General colonoscopy 2015 " with general anesthesia.  Procedure performed at an [facility] Facility. Denies Family Hx of Anesthesia complications.   Denies Personal Hx of Anesthesia complications.   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 QUOC mass; Pulmonary edema   Renal/:   Denies Chronic Renal Disease.     Hepatic/GI:   Denies GERD.    Neurological:   Denies CVA. Denies Seizures. Essential tremors mostly with stress Denies Pain    Endocrine:   Denies Diabetes.    Psych:   anxiety Panic attacks         Physical Exam  General:  Well nourished    Airway/Jaw/Neck:  Airway Findings: Mouth Opening: Normal Tongue: Normal  General Airway Assessment: Adult  Mallampati: III  Improves to II with phonation.  TM Distance: Normal, at least 6 cm  Jaw/Neck Findings:     Neck ROM: Normal ROM      Dental:  Dental Findings: molar caps   Chest/Lungs:  Chest/Lungs Findings: Decreased Breath Sounds Bilateral, Expiratory Wheezes, Mild     Heart/Vascular:  Heart Findings: Rate: Normal  Rhythm: Regular Rhythm  Sounds: Normal        Mental Status:  Mental Status Findings:  Cooperative, Alert and Oriented       Pt was seen in POC 11/28/17; cleared by PCP, Dr Lou/Sandy Canas, [place]        Anesthesia Plan  Type of Anesthesia, risks & benefits discussed:  Anesthesia Type:  general  Patient's Preference:   Intra-op Monitoring Plan: standard ASA monitors  Intra-op Monitoring Plan Comments:   Post Op Pain Control Plan:   Post Op Pain Control Plan Comments:   Induction:   IV  Beta Blocker:  Patient is on a Beta-Blocker and has received one dose within the past 24 hours (No further documentation required).       Informed Consent: Patient understands  risks and agrees with Anesthesia plan.  Questions answered. Anesthesia consent signed with patient.  ASA Score: 2     Day of Surgery Review of History & Physical:    H&P update referred to the surgeon.         Ready For Surgery From Anesthesia Perspective.

## 2017-11-15 NOTE — PRE ADMISSION SCREENING
"Anesthesia Assessment: Preoperative EQUATION    Planned Procedure: Procedure(s) (LRB):  THORACOSCOPY-VIDEO ASSISTED (VATS) (Left)  Requested Anesthesia Type:General  Surgeon: Dinesh Ramirez MD  Service: Thoracic  Known or anticipated Date of Surgery:11/30/2017    Surgeon notes: reviewed and Lung Mass    Previous anesthesia records:5/18/15-Colonoscopy-General-No PONV-no apparent anesthetic complications, tolerated procedure well and no evidence of recall    Last PCP note: within 3 months , [facility]  Subspecialty notes: Pulmonary, Optometry     Tests already available:  Results have been reviewed.Labs-10/4/17-BMP/Hep C antibody/Magnesium/ 9/20/17-CBC/PT-INR/Alpha-1-antitrypsin Phenotype/ CXR-9/20/17/2DEcho-1/24/17                Plan:    Testing:  T&S   Pre-anesthesia  visit       Visit focus: concerns in complex and/or prolonged anesthesia, airway concerns     Consultation:Patient's PCP for a statement of optimization  Dr. CRYSTAL Lou-InMegan message sent  requesting "clearance" statement-Kim to send message on 11/15/17     Patient  has previously scheduled Medical Appointment:none    Navigation: Tests Scheduled. Lab-T&S on 11/28/17 @ 12:30p             Consults scheduled.POC on 11/28/17 @ 1p & Patient's PCP for a statement of optimization:  Dr. CRYSTAL HallInMegan message sent  requesting "clearance" statement-Kim to send message on 11/15/17             Results will be tracked by Preop Clinic.                                                 Elena River RN  11/15/17                "

## 2017-11-16 ENCOUNTER — TELEPHONE (OUTPATIENT)
Dept: INTERNAL MEDICINE | Facility: CLINIC | Age: 57
End: 2017-11-16

## 2017-11-16 DIAGNOSIS — R06.09 DYSPNEA ON EXERTION: Primary | ICD-10-CM

## 2017-11-16 NOTE — TELEPHONE ENCOUNTER
"----- Message from Maikol Campbell MA sent at 11/15/2017  2:54 PM CST -----  Regarding: Surgery Clearance   Patient is having a Thoracoscopy-Video Assisted(VATS) on 11/30/2017 with Dr Ramirez. Requesting a "clearance" from you prior to surgery date. Patient was seen recently by you on 10/4/2017. Await your reply. Thank you. Sincerely, Maikol Campbell MA Pre-op Anesthesia ext [phone]  "

## 2017-11-17 NOTE — TELEPHONE ENCOUNTER
Pt will need a stress test prior to her procedure. Please schedule sometime in next 2 weeks (prior to procedure 11/30/17).

## 2017-11-22 LAB
ACID FAST MOD KINY STN SPEC: NORMAL
MYCOBACTERIUM SPEC QL CULT: NORMAL

## 2017-11-27 ENCOUNTER — CLINICAL SUPPORT (OUTPATIENT)
Dept: CARDIOLOGY | Facility: CLINIC | Age: 57
End: 2017-11-27
Attending: INTERNAL MEDICINE
Payer: COMMERCIAL

## 2017-11-27 DIAGNOSIS — R06.09 DYSPNEA ON EXERTION: ICD-10-CM

## 2017-11-27 LAB — DIASTOLIC DYSFUNCTION: NO

## 2017-11-27 PROCEDURE — A9502 TC99M TETROFOSMIN: HCPCS | Mod: S$GLB,,, | Performed by: INTERNAL MEDICINE

## 2017-11-27 PROCEDURE — 78452 HT MUSCLE IMAGE SPECT MULT: CPT | Mod: S$GLB,,, | Performed by: INTERNAL MEDICINE

## 2017-11-27 PROCEDURE — 93015 CV STRESS TEST SUPVJ I&R: CPT | Mod: S$GLB,,, | Performed by: INTERNAL MEDICINE

## 2017-11-28 ENCOUNTER — HOSPITAL ENCOUNTER (OUTPATIENT)
Dept: PREADMISSION TESTING | Facility: HOSPITAL | Age: 57
Discharge: HOME | End: 2017-11-28
Attending: ANESTHESIOLOGY
Payer: COMMERCIAL

## 2017-11-28 ENCOUNTER — PATIENT MESSAGE (OUTPATIENT)
Dept: INTERNAL MEDICINE | Facility: CLINIC | Age: 57
End: 2017-11-28

## 2017-11-28 VITALS
WEIGHT: 158.38 LBS | OXYGEN SATURATION: 96 % | TEMPERATURE: 98 F | HEART RATE: 78 BPM | RESPIRATION RATE: 18 BRPM | HEIGHT: 63 IN | BODY MASS INDEX: 28.06 KG/M2 | SYSTOLIC BLOOD PRESSURE: 147 MMHG | DIASTOLIC BLOOD PRESSURE: 88 MMHG

## 2017-11-28 NOTE — DISCHARGE INSTRUCTIONS
Your surgery has been scheduled for:__________________________________________    You should report to:  ____[facility], located on the [place] side of the first floor of the           [facility] ([phone])  ____[facility], located on the [facility] side of the            Second floor of the [facility] ([phone])  ____3rd Floor [facility] located on the [facility] side of the [facility] [phone]  Please Note   - Tell your doctor if you take Aspirin, products containing Aspirin, herbal medications  or blood thinners, such as Coumadin, Ticlid, or Plavix.  (Consult your provider regarding holding or stopping before surgery).  - Arrange for someone to drive you home following surgery.  You will not be allowed to leave the surgical facility alone or drive yourself home following sedation and anesthesia.  Before Surgery  - Stop taking all herbal medications 14days prior to surgery  - No Motrin/Advil (Ibuprofen) 7 days before surgery  - No Aleve (Naproxen) 7 days before surgery  - Stop Taking Asprin, products containing Asprin _____days before surgery  - Stop taking blood thinners_______days before surgery  - Refrain from drinking alcoholic beverages for 24hours before and after surgery  - Stop or limit smoking _________days before surgery  Night before Surgery  - DO NOT EAT OR DRINK ANYTHING AFTER MIDNIGHT, INCLUDING GUM, HARD CANDY, MINTS, OR CHEWING TOBACCO.  - Take a shower or bath (shower is recommended).  Bathe with Hibiclens soap or an antibacterial soap from the neck down.  If not supplied by your surgeon, hibiclens soap will need to be purchased over the counter in pharmacy.  Rinse soap off thoroughly.  - Shampoo your hair with your regular shampoo  The Day of Surgery  - Take another bath or shower with hibiclens or any antibacterial soap, to reduce the chance of infection.  - Take heart and blood  pressure medications with a small sip of water, as advised by the perioperative team.  - Do not take fluid pills  - You may brush your teeth and rinse your mouth, but do not swall any additional water.   - Do not apply perfumes, powder, body lotions or deodorant on the day of surgery.  - Nail polish should be removed.  - Do not wear makeup or moisturizer  - Wear comfortable clothes, such as a button front shirt and loose fitting pants.  - Leave all jewelry, including body piercings, and valuables at home.    - Bring any devices you will neeed after surgery such as crutches or canes.  - If you have sleep apnea, please bring your CPAP machine  In the event that your physical condition changes including the onset of a cold or respiratory illness, or if you have to delay or cancel your surgery, please notify your surgeon.Anesthesia: General Anesthesia  Youre due to have surgery. During surgery, youll be given medication called anesthesia. (It is also called anesthetic.) This will keep you comfortable and pain-free. Your anesthesia provider will use general anesthesia. This sheet tells you more about it.  What is general anesthesia?     You are watched continuously during your procedure by the anesthesia provider   General anesthesia puts you into a state like deep sleep. It goes into the bloodstream (IV anesthetics), into the lungs (gas anesthetics), or both. You feel nothing during the procedure. You will not remember it. During the procedure, the anesthesia provider monitors you continuously. He or she checks your heart rate and rhythm, blood pressure, breathing, and blood oxygen.  · IV Anesthetics. IV anesthetics are given through an IV line in your arm. Theyre often given first. This is so you are asleep before a gas anesthetic is started. Some kinds of IV anesthetics relieve pain. Others relax you. Your doctor will decide which kind is best in your case.  · Gas Anesthetics. Gas anesthetics are breathed into the  lungs. They are often used to keep you asleep. They can be given through a facemask or a tube placed in your larynx or trachea (breathing tube).  ? If you have a facemask, your anesthesia provider will most likely place it over your nose and mouth while youre still awake. Youll breathe oxygen through the mask as your IV anesthetic is started. Gas anesthetic may be added through the mask.  ? If you have a tube in the larynx or trachea, it will be inserted into your throat after youre asleep.  Anesthesia tools and medications  You will likely have:  · IV anesthetics. These are put into an IV line into your bloodstream.  · Gas anesthetics. You breathe these anesthetics into your lungs, where they pass into your bloodstream.  · Pulse oximeter. This is a small clip that is attached to the end of your finger. This measures your blood oxygen level.  · Electrocardiography leads (electrodes). These are small sticky pads that are placed on your chest. They record your heart rate and rhythm.  · Blood pressure cuff. This reads your blood pressure.  Risks and possible complications  General anesthesia has some risks. These include:  · Breathing problems  · Nausea and vomiting  · Sore throat or hoarseness (usually temporary)  · Allergic reaction to the anesthetic  · Irregular heartbeat (rare)  · Cardiac arrest (rare)   Anesthesia safety  · Follow all instructions you are given for how long not to eat or drink before your procedure.  · Be sure your doctor knows what medications and drugs you take. This includes over-the-counter medications, herbs, supplements, alcohol or other drugs. You will be asked when those were last taken.  · Have an adult family member or friend drive you home after the procedure.  · For the first 24 hours after your surgery:  ? Do not drive or use heavy equipment.  ? Have a trusted family member or spouse make important decisions or sign documents.  ? Avoid alcohol.  ? Have a responsible adult stay with  you. He or she can watch for problems and help keep you safe.  Date Last Reviewed: 10/16/2014  © 8805-1464 [employer]. [address] [place] [address], [place] [address]. All rights reserved. This information is not intended as a substitute for professional medical care. Always follow your healthcare professional's instructions.

## 2017-11-28 NOTE — TELEPHONE ENCOUNTER
I have reviewed the patient's stress test. The stress test shows no evidence of blockages in the coronary arteries. Patient is clear for procedure with CT surgery, I will notify their team. I have sent the patient a [facility] message.

## 2017-11-29 ENCOUNTER — TELEPHONE (OUTPATIENT)
Dept: CARDIOTHORACIC SURGERY | Facility: CLINIC | Age: 57
End: 2017-11-29

## 2017-11-30 ENCOUNTER — SURGERY (OUTPATIENT)
Age: 57
End: 2017-11-30

## 2017-11-30 ENCOUNTER — ANESTHESIA (OUTPATIENT)
Dept: SURGERY | Facility: HOSPITAL | Age: 57
DRG: 165 | End: 2017-11-30
Payer: COMMERCIAL

## 2017-11-30 ENCOUNTER — HOSPITAL ENCOUNTER (INPATIENT)
Facility: HOSPITAL | Age: 57
LOS: 1 days | Discharge: HOME | DRG: 165 | End: 2017-12-01
Attending: THORACIC SURGERY (CARDIOTHORACIC VASCULAR SURGERY) | Admitting: THORACIC SURGERY (CARDIOTHORACIC VASCULAR SURGERY)
Payer: COMMERCIAL

## 2017-11-30 DIAGNOSIS — R91.8 MASS OF LEFT LUNG: ICD-10-CM

## 2017-11-30 DIAGNOSIS — J44.9 CHRONIC OBSTRUCTIVE PULMONARY DISEASE, UNSPECIFIED COPD TYPE: Primary | ICD-10-CM

## 2017-11-30 PROCEDURE — 36620 INSERTION CATHETER ARTERY: CPT | Mod: 59,,, | Performed by: ANESTHESIOLOGY

## 2017-11-30 PROCEDURE — 63600175 PHARM REV CODE 636 W HCPCS: Performed by: STUDENT IN AN ORGANIZED HEALTH CARE EDUCATION/TRAINING PROGRAM

## 2017-11-30 PROCEDURE — 36000711: Performed by: THORACIC SURGERY (CARDIOTHORACIC VASCULAR SURGERY)

## 2017-11-30 PROCEDURE — 25000003 PHARM REV CODE 250: Performed by: PHYSICIAN ASSISTANT

## 2017-11-30 PROCEDURE — 25000003 PHARM REV CODE 250: Performed by: SURGERY

## 2017-11-30 PROCEDURE — 07B74ZX EXCISION OF THORAX LYMPHATIC, PERCUTANEOUS ENDOSCOPIC APPROACH, DIAGNOSTIC: ICD-10-PCS | Performed by: THORACIC SURGERY (CARDIOTHORACIC VASCULAR SURGERY)

## 2017-11-30 PROCEDURE — 94799 UNLISTED PULMONARY SVC/PX: CPT

## 2017-11-30 PROCEDURE — 27201037 HC PRESSURE MONITORING SET UP

## 2017-11-30 PROCEDURE — 25000003 PHARM REV CODE 250: Performed by: ANESTHESIOLOGY

## 2017-11-30 PROCEDURE — 88305 TISSUE EXAM BY PATHOLOGIST: CPT | Mod: 26,,, | Performed by: PATHOLOGY

## 2017-11-30 PROCEDURE — 36000710: Performed by: THORACIC SURGERY (CARDIOTHORACIC VASCULAR SURGERY)

## 2017-11-30 PROCEDURE — 25000003 PHARM REV CODE 250

## 2017-11-30 PROCEDURE — C9290 INJ, BUPIVACAINE LIPOSOME: HCPCS | Performed by: THORACIC SURGERY (CARDIOTHORACIC VASCULAR SURGERY)

## 2017-11-30 PROCEDURE — 63600175 PHARM REV CODE 636 W HCPCS: Performed by: PHYSICIAN ASSISTANT

## 2017-11-30 PROCEDURE — 63600175 PHARM REV CODE 636 W HCPCS

## 2017-11-30 PROCEDURE — 20600001 HC STEP DOWN PRIVATE ROOM

## 2017-11-30 PROCEDURE — 88312 SPECIAL STAINS GROUP 1: CPT | Mod: 26,,, | Performed by: PATHOLOGY

## 2017-11-30 PROCEDURE — 3E0T3BZ INTRODUCTION OF ANESTHETIC AGENT INTO PERIPHERAL NERVES AND PLEXI, PERCUTANEOUS APPROACH: ICD-10-PCS | Performed by: THORACIC SURGERY (CARDIOTHORACIC VASCULAR SURGERY)

## 2017-11-30 PROCEDURE — 88331 PATH CONSLTJ SURG 1 BLK 1SPC: CPT | Mod: 26,,, | Performed by: PATHOLOGY

## 2017-11-30 PROCEDURE — 88307 TISSUE EXAM BY PATHOLOGIST: CPT | Mod: 26,,, | Performed by: PATHOLOGY

## 2017-11-30 PROCEDURE — 88307 TISSUE EXAM BY PATHOLOGIST: CPT | Performed by: PATHOLOGY

## 2017-11-30 PROCEDURE — 94761 N-INVAS EAR/PLS OXIMETRY MLT: CPT

## 2017-11-30 PROCEDURE — 37000008 HC ANESTHESIA 1ST 15 MINUTES: Performed by: THORACIC SURGERY (CARDIOTHORACIC VASCULAR SURGERY)

## 2017-11-30 PROCEDURE — 63600175 PHARM REV CODE 636 W HCPCS: Performed by: ANESTHESIOLOGY

## 2017-11-30 PROCEDURE — 32666 THORACOSCOPY W/WEDGE RESECT: CPT | Mod: LT,,, | Performed by: THORACIC SURGERY (CARDIOTHORACIC VASCULAR SURGERY)

## 2017-11-30 PROCEDURE — 27201423 OPTIME MED/SURG SUP & DEVICES STERILE SUPPLY: Performed by: THORACIC SURGERY (CARDIOTHORACIC VASCULAR SURGERY)

## 2017-11-30 PROCEDURE — 37000009 HC ANESTHESIA EA ADD 15 MINS: Performed by: THORACIC SURGERY (CARDIOTHORACIC VASCULAR SURGERY)

## 2017-11-30 PROCEDURE — 88305 TISSUE EXAM BY PATHOLOGIST: CPT | Performed by: PATHOLOGY

## 2017-11-30 PROCEDURE — 0BBG4ZZ EXCISION OF LEFT UPPER LUNG LOBE, PERCUTANEOUS ENDOSCOPIC APPROACH: ICD-10-PCS | Performed by: THORACIC SURGERY (CARDIOTHORACIC VASCULAR SURGERY)

## 2017-11-30 PROCEDURE — 27000221 HC OXYGEN, UP TO 24 HOURS

## 2017-11-30 PROCEDURE — C1729 CATH, DRAINAGE: HCPCS | Performed by: THORACIC SURGERY (CARDIOTHORACIC VASCULAR SURGERY)

## 2017-11-30 PROCEDURE — D9220A PRA ANESTHESIA: Mod: ,,, | Performed by: ANESTHESIOLOGY

## 2017-11-30 PROCEDURE — 63600175 PHARM REV CODE 636 W HCPCS: Performed by: THORACIC SURGERY (CARDIOTHORACIC VASCULAR SURGERY)

## 2017-11-30 PROCEDURE — 71000039 HC RECOVERY, EACH ADD'L HOUR: Performed by: THORACIC SURGERY (CARDIOTHORACIC VASCULAR SURGERY)

## 2017-11-30 PROCEDURE — 71000033 HC RECOVERY, INTIAL HOUR: Performed by: THORACIC SURGERY (CARDIOTHORACIC VASCULAR SURGERY)

## 2017-11-30 RX ORDER — VALSARTAN 160 MG/1
160 TABLET ORAL EVERY MORNING
Status: DISCONTINUED | OUTPATIENT
Start: 2017-11-30 | End: 2017-12-01 | Stop reason: HOSPADM

## 2017-11-30 RX ORDER — LIDOCAINE HCL/PF 100 MG/5ML
SYRINGE (ML) INTRAVENOUS
Status: DISCONTINUED | OUTPATIENT
Start: 2017-11-30 | End: 2017-11-30

## 2017-11-30 RX ORDER — ONDANSETRON 2 MG/ML
4 INJECTION INTRAMUSCULAR; INTRAVENOUS DAILY PRN
Status: DISCONTINUED | OUTPATIENT
Start: 2017-11-30 | End: 2017-11-30 | Stop reason: HOSPADM

## 2017-11-30 RX ORDER — PHENYLEPHRINE HYDROCHLORIDE 10 MG/ML
INJECTION INTRAVENOUS
Status: DISCONTINUED | OUTPATIENT
Start: 2017-11-30 | End: 2017-11-30

## 2017-11-30 RX ORDER — AMOXICILLIN 250 MG
1 CAPSULE ORAL 2 TIMES DAILY
Status: DISCONTINUED | OUTPATIENT
Start: 2017-11-30 | End: 2017-12-01 | Stop reason: HOSPADM

## 2017-11-30 RX ORDER — ENOXAPARIN SODIUM 100 MG/ML
40 INJECTION SUBCUTANEOUS
Status: DISCONTINUED | OUTPATIENT
Start: 2017-12-01 | End: 2017-12-01 | Stop reason: HOSPADM

## 2017-11-30 RX ORDER — METOCLOPRAMIDE HYDROCHLORIDE 5 MG/ML
5 INJECTION INTRAMUSCULAR; INTRAVENOUS EVERY 6 HOURS PRN
Status: DISCONTINUED | OUTPATIENT
Start: 2017-11-30 | End: 2017-11-30

## 2017-11-30 RX ORDER — SODIUM CHLORIDE 0.9 % (FLUSH) 0.9 %
3 SYRINGE (ML) INJECTION
Status: DISCONTINUED | OUTPATIENT
Start: 2017-11-30 | End: 2017-12-01 | Stop reason: HOSPADM

## 2017-11-30 RX ORDER — HYDROMORPHONE HYDROCHLORIDE 1 MG/ML
0.5 INJECTION, SOLUTION INTRAMUSCULAR; INTRAVENOUS; SUBCUTANEOUS EVERY 6 HOURS PRN
Status: DISCONTINUED | OUTPATIENT
Start: 2017-11-30 | End: 2017-12-01 | Stop reason: HOSPADM

## 2017-11-30 RX ORDER — HYDROMORPHONE HYDROCHLORIDE 2 MG/ML
0.2 INJECTION, SOLUTION INTRAMUSCULAR; INTRAVENOUS; SUBCUTANEOUS EVERY 5 MIN PRN
Status: DISCONTINUED | OUTPATIENT
Start: 2017-11-30 | End: 2017-11-30 | Stop reason: HOSPADM

## 2017-11-30 RX ORDER — ROCURONIUM BROMIDE 10 MG/ML
INJECTION, SOLUTION INTRAVENOUS
Status: DISCONTINUED | OUTPATIENT
Start: 2017-11-30 | End: 2017-11-30

## 2017-11-30 RX ORDER — ONDANSETRON 8 MG/1
8 TABLET, ORALLY DISINTEGRATING ORAL EVERY 8 HOURS PRN
Status: DISCONTINUED | OUTPATIENT
Start: 2017-11-30 | End: 2017-12-01 | Stop reason: HOSPADM

## 2017-11-30 RX ORDER — MIDAZOLAM HYDROCHLORIDE 1 MG/ML
INJECTION INTRAMUSCULAR; INTRAVENOUS
Status: DISCONTINUED | OUTPATIENT
Start: 2017-11-30 | End: 2017-11-30

## 2017-11-30 RX ORDER — FLUTICASONE FUROATE AND VILANTEROL 200; 25 UG/1; UG/1
1 POWDER RESPIRATORY (INHALATION) DAILY
Status: DISCONTINUED | OUTPATIENT
Start: 2017-12-01 | End: 2017-12-01 | Stop reason: HOSPADM

## 2017-11-30 RX ORDER — HYDROMORPHONE HYDROCHLORIDE 2 MG/ML
0.5 INJECTION, SOLUTION INTRAMUSCULAR; INTRAVENOUS; SUBCUTANEOUS
Status: DISCONTINUED | OUTPATIENT
Start: 2017-11-30 | End: 2017-11-30

## 2017-11-30 RX ORDER — PROMETHAZINE HYDROCHLORIDE 12.5 MG/1
12.5 TABLET ORAL EVERY 6 HOURS PRN
Status: DISCONTINUED | OUTPATIENT
Start: 2017-11-30 | End: 2017-12-01 | Stop reason: HOSPADM

## 2017-11-30 RX ORDER — MUPIROCIN 20 MG/G
1 OINTMENT TOPICAL 2 TIMES DAILY
Status: DISCONTINUED | OUTPATIENT
Start: 2017-11-30 | End: 2017-11-30

## 2017-11-30 RX ORDER — HYDROMORPHONE HYDROCHLORIDE 2 MG/ML
INJECTION, SOLUTION INTRAMUSCULAR; INTRAVENOUS; SUBCUTANEOUS
Status: COMPLETED
Start: 2017-11-30 | End: 2017-11-30

## 2017-11-30 RX ORDER — ALBUTEROL SULFATE 90 UG/1
1 AEROSOL, METERED RESPIRATORY (INHALATION) EVERY 6 HOURS PRN
Status: DISCONTINUED | OUTPATIENT
Start: 2017-11-30 | End: 2017-12-01 | Stop reason: HOSPADM

## 2017-11-30 RX ORDER — SODIUM CHLORIDE 9 MG/ML
INJECTION, SOLUTION INTRAVENOUS CONTINUOUS PRN
Status: DISCONTINUED | OUTPATIENT
Start: 2017-11-30 | End: 2017-11-30

## 2017-11-30 RX ORDER — LIDOCAINE 50 MG/G
1 PATCH TOPICAL
Status: DISCONTINUED | OUTPATIENT
Start: 2017-11-30 | End: 2017-12-01 | Stop reason: HOSPADM

## 2017-11-30 RX ORDER — ONDANSETRON HYDROCHLORIDE 2 MG/ML
INJECTION, SOLUTION INTRAMUSCULAR; INTRAVENOUS
Status: DISCONTINUED | OUTPATIENT
Start: 2017-11-30 | End: 2017-11-30

## 2017-11-30 RX ORDER — DEXTROSE MONOHYDRATE, SODIUM CHLORIDE, AND POTASSIUM CHLORIDE 50; 1.49; 4.5 G/1000ML; G/1000ML; G/1000ML
INJECTION, SOLUTION INTRAVENOUS CONTINUOUS
Status: DISCONTINUED | OUTPATIENT
Start: 2017-11-30 | End: 2017-11-30

## 2017-11-30 RX ORDER — OXYCODONE AND ACETAMINOPHEN 10; 325 MG/1; MG/1
1 TABLET ORAL EVERY 4 HOURS PRN
Status: DISCONTINUED | OUTPATIENT
Start: 2017-11-30 | End: 2017-11-30

## 2017-11-30 RX ORDER — HYDROMORPHONE HYDROCHLORIDE 2 MG/ML
INJECTION, SOLUTION INTRAMUSCULAR; INTRAVENOUS; SUBCUTANEOUS
Status: DISCONTINUED | OUTPATIENT
Start: 2017-11-30 | End: 2017-11-30

## 2017-11-30 RX ORDER — PROPOFOL 10 MG/ML
VIAL (ML) INTRAVENOUS
Status: DISCONTINUED | OUTPATIENT
Start: 2017-11-30 | End: 2017-11-30

## 2017-11-30 RX ORDER — DEXTROSE MONOHYDRATE, SODIUM CHLORIDE, AND POTASSIUM CHLORIDE 50; 1.49; 4.5 G/1000ML; G/1000ML; G/1000ML
INJECTION, SOLUTION INTRAVENOUS
Status: COMPLETED
Start: 2017-11-30 | End: 2017-11-30

## 2017-11-30 RX ORDER — OXYCODONE AND ACETAMINOPHEN 5; 325 MG/1; MG/1
1 TABLET ORAL EVERY 4 HOURS PRN
Status: DISCONTINUED | OUTPATIENT
Start: 2017-11-30 | End: 2017-11-30

## 2017-11-30 RX ORDER — MONTELUKAST SODIUM 10 MG/1
10 TABLET ORAL EVERY MORNING
Status: DISCONTINUED | OUTPATIENT
Start: 2017-11-30 | End: 2017-12-01 | Stop reason: HOSPADM

## 2017-11-30 RX ORDER — TIOTROPIUM BROMIDE 18 UG/1
18 CAPSULE ORAL; RESPIRATORY (INHALATION) DAILY
Status: DISCONTINUED | OUTPATIENT
Start: 2017-12-01 | End: 2017-12-01 | Stop reason: HOSPADM

## 2017-11-30 RX ORDER — FENTANYL CITRATE 50 UG/ML
INJECTION, SOLUTION INTRAMUSCULAR; INTRAVENOUS
Status: DISCONTINUED | OUTPATIENT
Start: 2017-11-30 | End: 2017-11-30

## 2017-11-30 RX ADMIN — TRAMADOL HYDROCHLORIDE 100 MG: 50 TABLET, FILM COATED ORAL at 10:11

## 2017-11-30 RX ADMIN — EPHEDRINE SULFATE 5 MG: 50 INJECTION, SOLUTION INTRAMUSCULAR; INTRAVENOUS; SUBCUTANEOUS at 08:11

## 2017-11-30 RX ADMIN — SODIUM CHLORIDE: 0.9 INJECTION, SOLUTION INTRAVENOUS at 07:11

## 2017-11-30 RX ADMIN — FENTANYL CITRATE 100 MCG: 50 INJECTION, SOLUTION INTRAMUSCULAR; INTRAVENOUS at 08:11

## 2017-11-30 RX ADMIN — STANDARDIZED SENNA CONCENTRATE AND DOCUSATE SODIUM 1 TABLET: 8.6; 5 TABLET, FILM COATED ORAL at 10:11

## 2017-11-30 RX ADMIN — EPHEDRINE SULFATE 10 MG: 50 INJECTION, SOLUTION INTRAMUSCULAR; INTRAVENOUS; SUBCUTANEOUS at 08:11

## 2017-11-30 RX ADMIN — PROPOFOL 150 MG: 10 INJECTION, EMULSION INTRAVENOUS at 07:11

## 2017-11-30 RX ADMIN — MONTELUKAST SODIUM 10 MG: 10 TABLET, FILM COATED ORAL at 11:11

## 2017-11-30 RX ADMIN — PHENYLEPHRINE HYDROCHLORIDE 100 MCG: 10 INJECTION INTRAVENOUS at 08:11

## 2017-11-30 RX ADMIN — LIDOCAINE 1 PATCH: 50 PATCH TOPICAL at 02:11

## 2017-11-30 RX ADMIN — EPHEDRINE SULFATE 10 MG: 50 INJECTION, SOLUTION INTRAMUSCULAR; INTRAVENOUS; SUBCUTANEOUS at 09:11

## 2017-11-30 RX ADMIN — ONDANSETRON 8 MG: 8 TABLET, ORALLY DISINTEGRATING ORAL at 02:11

## 2017-11-30 RX ADMIN — LIDOCAINE HYDROCHLORIDE 80 MG: 20 INJECTION, SOLUTION INTRAVENOUS at 07:11

## 2017-11-30 RX ADMIN — PHENYLEPHRINE HYDROCHLORIDE 100 MCG: 10 INJECTION INTRAVENOUS at 07:11

## 2017-11-30 RX ADMIN — MUPIROCIN 1 G: 20 OINTMENT TOPICAL at 10:11

## 2017-11-30 RX ADMIN — ROCURONIUM BROMIDE 40 MG: 10 INJECTION, SOLUTION INTRAVENOUS at 07:11

## 2017-11-30 RX ADMIN — Medication 2 G: at 08:11

## 2017-11-30 RX ADMIN — HYDROMORPHONE HYDROCHLORIDE 0.2 MG: 2 INJECTION INTRAMUSCULAR; INTRAVENOUS; SUBCUTANEOUS at 11:11

## 2017-11-30 RX ADMIN — DEXTROSE MONOHYDRATE, SODIUM CHLORIDE, AND POTASSIUM CHLORIDE: 50; 4.5; 1.49 INJECTION, SOLUTION INTRAVENOUS at 10:11

## 2017-11-30 RX ADMIN — MIDAZOLAM HYDROCHLORIDE 2 MG: 1 INJECTION, SOLUTION INTRAMUSCULAR; INTRAVENOUS at 07:11

## 2017-11-30 RX ADMIN — PROMETHAZINE HYDROCHLORIDE 12.5 MG: 25 INJECTION INTRAMUSCULAR; INTRAVENOUS at 02:11

## 2017-11-30 RX ADMIN — OXYCODONE HYDROCHLORIDE AND ACETAMINOPHEN 1 TABLET: 5; 325 TABLET ORAL at 10:11

## 2017-11-30 RX ADMIN — FENTANYL CITRATE 100 MCG: 50 INJECTION, SOLUTION INTRAMUSCULAR; INTRAVENOUS at 07:11

## 2017-11-30 RX ADMIN — HYDROMORPHONE HYDROCHLORIDE 0.2 MG: 2 INJECTION, SOLUTION INTRAMUSCULAR; INTRAVENOUS; SUBCUTANEOUS at 11:11

## 2017-11-30 RX ADMIN — BUPIVACAINE 20 ML: 13.3 INJECTION, SUSPENSION, LIPOSOMAL INFILTRATION at 08:11

## 2017-11-30 RX ADMIN — ONDANSETRON 4 MG: 2 INJECTION, SOLUTION INTRAMUSCULAR; INTRAVENOUS at 09:11

## 2017-11-30 RX ADMIN — HYDROMORPHONE HYDROCHLORIDE 0.4 MG: 2 INJECTION INTRAMUSCULAR; INTRAVENOUS; SUBCUTANEOUS at 08:11

## 2017-11-30 RX ADMIN — ONDANSETRON 4 MG: 2 INJECTION, SOLUTION INTRAMUSCULAR; INTRAVENOUS at 11:11

## 2017-11-30 RX ADMIN — OXYCODONE HYDROCHLORIDE AND ACETAMINOPHEN 1 TABLET: 10; 325 TABLET ORAL at 02:11

## 2017-11-30 RX ADMIN — STANDARDIZED SENNA CONCENTRATE AND DOCUSATE SODIUM 1 TABLET: 8.6; 5 TABLET, FILM COATED ORAL at 08:11

## 2017-11-30 RX ADMIN — VALSARTAN 160 MG: 160 TABLET, FILM COATED ORAL at 11:11

## 2017-11-30 RX ADMIN — DEXTROSE MONOHYDRATE, SODIUM CHLORIDE, AND POTASSIUM CHLORIDE: 50; 1.49; 4.5 INJECTION, SOLUTION INTRAVENOUS at 10:11

## 2017-11-30 RX ADMIN — SODIUM CHLORIDE, SODIUM GLUCONATE, SODIUM ACETATE, POTASSIUM CHLORIDE, MAGNESIUM CHLORIDE, SODIUM PHOSPHATE, DIBASIC, AND POTASSIUM PHOSPHATE: .53; .5; .37; .037; .03; .012; .00082 INJECTION, SOLUTION INTRAVENOUS at 07:11

## 2017-11-30 NOTE — NURSING TRANSFER
Nursing Transfer Note      11/30/2017     Transfer To: 622    Transfer via bed    Transfer with ivf,O2, chest tube    Transported by pct and rn    Medicines sent: ivf,o2, bactroban tube    Chart send with patient: Yes    Notified: daughter has gone to the room    Patient reassessed at: 11/30/17

## 2017-11-30 NOTE — PLAN OF CARE
Pt vital signs wnl.  Pt pain tolerable and no nausea.  Left chest dressing intact and left chest tube patent.

## 2017-11-30 NOTE — ANESTHESIA RELEASE NOTE
"Anesthesia Release from PACU Note    Patient: Diana Sellers    Procedure(s) Performed: Procedure(s) (LRB):  THORACOSCOPY-VIDEO ASSISTED (VATS) W/WEDGE LUNG RESECTION (Left)    Anesthesia type: general    Post pain: Adequate analgesia    Post assessment: no apparent anesthetic complications    Last Vitals:   Visit Vitals  /65   Pulse 73   Temp 36.6 °C (97.9 °F) (Oral)   Resp 20   Ht 5' 3" (1.6 m)   Wt 71.7 kg (158 lb)   SpO2 96%   Breastfeeding? No   BMI 27.99 kg/m²       Post vital signs: stable    Level of consciousness: awake    Nausea/Vomiting: no nausea/no vomiting    Complications: none    Airway Patency: patent    Respiratory: unassisted    Cardiovascular: stable and blood pressure at baseline    Hydration: euvolemic  "

## 2017-11-30 NOTE — TRANSFER OF CARE
"Anesthesia Transfer of Care Note    Patient: Diana Sellers    Procedure(s) Performed: Procedure(s) (LRB):  THORACOSCOPY-VIDEO ASSISTED (VATS) W/WEDGE LUNG RESECTION (Left)    Patient location: PACU    Anesthesia Type: general    Transport from OR: Transported from OR on room air with adequate spontaneous ventilation    Post pain: adequate analgesia    Post assessment: no apparent anesthetic complications    Post vital signs: stable    Level of consciousness: awake and alert    Nausea/Vomiting: no nausea/vomiting    Complications: none          Last vitals:   Visit Vitals  /75 (BP Location: Right arm, Patient Position: Lying)   Pulse 78   Temp 36.7 °C (98 °F) (Temporal)   Resp 14   Ht 5' 3" (1.6 m)   Wt 71.7 kg (158 lb)   SpO2 96%   Breastfeeding? No   BMI 27.99 kg/m²     "

## 2017-11-30 NOTE — ANESTHESIA PROCEDURE NOTES
Arterial    Diagnosis: COPD    Patient location during procedure: done in OR  Procedure start time: 11/30/2017 7:35 AM  Timeout: 11/30/2017 7:35 AM  Procedure end time: 11/30/2017 7:40 AM  Staffing  Anesthesiologist: MILES BANEGAS  Resident/CRNA: SHERRILL VELAZQUEZ JR.  Performed: resident/CRNA   Anesthesiologist was present at the time of the procedure.  Preanesthetic Checklist  Completed: patient identified, site marked, surgical consent, pre-op evaluation, timeout performed, IV checked, risks and benefits discussed, monitors and equipment checked and anesthesia consent givenArterial  Skin Prep: chlorhexidine gluconate  Local Infiltration: none  Orientation: right  Location: radial  Catheter Size: 20 G  Catheter placement by Anatomical landmarks. Heme positive aspiration all ports.Insertion Attempts: 1  Assessment  Dressing: secured with tape and tegaderm  Patient: Tolerated well

## 2017-11-30 NOTE — HPI
56 y/o female former smoker with COPD, HTN, and incidentally found QUOC pulmonary nodule versus mediastinal mass. Incidentally found on CXR which prompted Chest CT which revealed a 2.2 x 1.3 cm soft tissue nodule in the medial aspect of the anterior segment of the left upper lobe. IR biopsy - no neoplasia, necrotic tissue with fibrosis. PET with minimal SUV max 1.9. She is active at work but requires inhaler prn in addition to controller meds. Occasional chest tightness and wheezing contributed to COPD. Takes lasix 2-3 times per week for pulmonary edema which helps with SOB. Follows with Dr. Lopes. She denies fever, chills, CP, nausea, vomiting, weight changes.       Former smoker. Quit 3 years ago. 35 pack years  PSH: DNC, shoulder biopsy.   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

## 2017-11-30 NOTE — ANESTHESIA POSTPROCEDURE EVALUATION
"Anesthesia Post Evaluation    Patient: Diana Sellers    Procedure(s) Performed: Procedure(s) (LRB):  THORACOSCOPY-VIDEO ASSISTED (VATS) W/WEDGE LUNG RESECTION (Left)    Final Anesthesia Type: general  Patient location during evaluation: PACU  Patient participation: Yes- Able to Participate  Level of consciousness: awake and alert  Post-procedure vital signs: reviewed and stable  Pain management: adequate  Airway patency: patent  PONV status at discharge: No PONV  Anesthetic complications: no      Cardiovascular status: blood pressure returned to baseline  Respiratory status: unassisted  Hydration status: euvolemic  Follow-up not needed.        Visit Vitals  /65   Pulse 73   Temp 36.6 °C (97.9 °F) (Oral)   Resp 20   Ht 5' 3" (1.6 m)   Wt 71.7 kg (158 lb)   SpO2 96%   Breastfeeding? No   BMI 27.99 kg/m²       Pain/Zeinab Score: Pain Assessment Performed: Yes (11/30/2017 11:15 AM)  Presence of Pain: complains of pain/discomfort (11/30/2017 11:15 AM)  Pain Rating Prior to Med Admin: 3 (11/30/2017 11:15 AM)  Pain Rating Post Med Admin: 0 (11/30/2017 10:50 AM)  Zeinab Score: 9 (11/30/2017 11:15 AM)      "

## 2017-11-30 NOTE — OP NOTE
[facility]  Surgery Department  Operative Note    SUMMARY     Date of Procedure: 11/30/2017     Procedure: Procedure(s) (LRB):  THORACOSCOPY-VIDEO ASSISTED (VATS) W/WEDGE LUNG RESECTION (Left)     Surgeon(s) and Role:     * Tam Trent MD - Resident - Assisting     * Dinesh Ramirez MD - Primary    Pre-Operative Diagnosis: Lung mass [R91.8]    Post-Operative Diagnosis: Post-Op Diagnosis Codes:     * Lung mass [R91.8]    Anesthesia: General    Technical Procedures Used:   1.  Left VATS with left upper lobe wedge resection  2.  Level 5 mediastinal lymph node excisional biopsy  3.  Intercostal nerve block    Description of the Findings of the Procedure:   Prior to proceeding to the operating room, the procedure was described in detail to the patient, all questions were answered and appropriate consent was obtained.  The patient was then taken to the operating room and placed in the right lateral decubitus position.  General anesthesia with a dual lumen endotracheal tube was induced.  The patient was prepped and draped in standard fashion.  A timeout was performed.  An incision was made over the left eighth rib interspace with scalpel and the electrocautery.  The chest was entered bluntly with a tonsil clamp.  A finger sweep was performed which showed no evidence of adhesions.  A port was placed and the camera was inserted into the left chest.  Two more ports were placed anteriorly and superiorly under direct vision.  One small area of adhesions to the anterior aspect of the upper lobe were taken down with electrocautery.  Adhesions were also noted between the superior aspect of the left upper lobe and the mediastinum in the location of the known mass.  These were taken down with electrocautery and blunt dissection taking care to avoid injury to the internal mammary vessels and the left phrenic nerve.  A wedge resection including the mass was performed with four fires of the blue load  EndoGIA stapler.  The wedge resection was removed via EndoCatch bag and sent for frozen section.  A level five lymph node was noted to be firm and was biopsied and sent for permanent.  Hemostasis was achieved with Surgicel and Court.  The frozen section was negative for malignancy.  A 24Fr Arnoldo drain was left posteriorly and superiorly and exited through the eighth rib interspace port site.  The port sites were then closed with 2-0 and 3-0 Vicryl and the skin was closed with 4-0 Monocryl.  Steri strips and sterile dressings were applied.  All counts were correct.    The patient tolerated the procedure well. No complications occurred during or immediately after the procedure. The patient was taken to the PACU satisfactory condition.     Complications: No    Estimated Blood Loss (EBL): 30cc    Implants: * No implants in log *    Specimens:   Specimen (12h ago through future)    Start     Ordered    11/30/17 0917  Specimen to Pathology - Surgery  Once     Comments:  1. Left upper lobe wedge - FROZEN2. Level 5 lymph node - PERM      11/30/17 0917                  Condition: Good    Disposition: PACU - hemodynamically stable.

## 2017-11-30 NOTE — INTERVAL H&P NOTE
The patient has been examined and the H&P has been reviewed:    I concur with the findings and no changes have occurred since H&P was written.    Anesthesia/Surgery risks, benefits and alternative options discussed and understood by patient/family.          Active Hospital Problems    Diagnosis  POA    Mass of left lung [R91.8]  Yes      Resolved Hospital Problems    Diagnosis Date Resolved POA   No resolved problems to display.

## 2017-11-30 NOTE — H&P (VIEW-ONLY)
History & Physical    SUBJECTIVE:     History of Present Illness:    56 y/o female former smoker with COPD, HTN, and incidentally found QUOC pulmonary nodule versus mediastinal mass. Incidentally found on CXR which prompted Chest CT which revealed a 2.2 x 1.3 cm soft tissue nodule in the medial aspect of the anterior segment of the left upper lobe. IR biopsy - no neoplasia, necrotic tissue with fibrosis. PET with minimal SUV max 1.9. She is active at work but requires inhaler prn in addition to controller meds. Occasional chest tightness and wheezing contributed to COPD. Takes lasix 2-3 times per week for pulmonary edema which helps with SOB. Follows with Dr. Lopes. She denies fever, chills, CP, nausea, vomiting, weight changes.      Former smoker. Quit 3 years ago. 35 pack years  PSH: DNC, shoulder biopsy.   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       Chief Complaint   Patient presents with    Consult       Review of patient's allergies indicates:   Allergen Reactions    Ace inhibitors        Current Outpatient Prescriptions   Medication Sig Dispense Refill    albuterol (VENTOLIN HFA) 90 mcg/actuation inhaler Inhale 2 puffs into the lungs every 6 hours as needed for wheezing. 18 g 11    albuterol-ipratropium 2.5mg-0.5mg/3mL (DUO-NEB) 0.5 mg-3 mg(2.5 mg base)/3 mL nebulizer solution Take 3 mLs by nebulization every 6 (six) hours as needed. 90 vial 3    fluticasone-salmeterol 500-50 mcg/dose (ADVAIR DISKUS) 500-50 mcg/dose DsDv diskus inhaler Inhale 1 puff into the lungs 2 (two) times daily. Controller 60 each 11    furosemide (LASIX) 40 MG tablet Take 1 tablet (40 mg total) by mouth daily as needed (edema). 30 tablet 11    inhalation device (AEROCHAMBER PLUS FLOW-VU) Use as directed for inhalation. 1 Device 0    magnesium oxide (MAG-OX) 250 mg Tab Take 250 mg by mouth daily as needed.      montelukast (SINGULAIR) 10 mg tablet Take 1 tablet (10 mg total) by mouth once daily. 30 tablet 11    propranolol  (INDERAL) 10 MG tablet Take 1 tablet (10 mg total) by mouth every 6 (six) hours as needed (tremors). 90 tablet 11    valsartan (DIOVAN) 160 MG tablet Take 1 tablet (160 mg total) by mouth once daily. 90 tablet 3    tiotropium (SPIRIVA) 18 mcg inhalation capsule Inhale 1 capsule (18 mcg total) into the lungs once daily. 90 capsule 3     No current facility-administered medications for this visit.        Past Medical History:   Diagnosis Date    Allergy     Anxiety     COPD (chronic obstructive pulmonary disease)     Fractures 2007    History rib fractures with coughing, last in 2007.    History of vitamin D deficiency     mild in past, treated with 50k units Vit D3 weekly, no longer on treatment    Hypertension      Past Surgical History:   Procedure Laterality Date    BONE BIOPSY Right     shoulder    COLONOSCOPY  late 90s?    [facility]  ~2003 or 2004    [facility]    DILATION AND CURETTAGE OF UTERUS  1980    WISDOM TOOTH EXTRACTION      1981     Family History   Problem Relation Age of Onset    Hypertension Mother     Diabetes Mother     Colon cancer Father     Hypertension Father     Retinal detachment Father     Cancer Father      colon    Spina bifida Brother     Amblyopia Neg Hx     Blindness Neg Hx     Cataracts Neg Hx     Glaucoma Neg Hx     Macular degeneration Neg Hx     Strabismus Neg Hx      Social History   Substance Use Topics    Smoking status: Former Smoker     Packs/day: 1.00     Years: 35.00     Types: Cigarettes     Quit date: 1/1/2014    Smokeless tobacco: Never Used      Comment: she quit!    Alcohol use 1.8 oz/week     3 Glasses of wine per week      Comment: 3 glass wine/day        Review of Systems:  Review of Systems   Constitutional: Negative for activity change, chills, fatigue, fever and unexpected weight change.   Respiratory: Positive for cough, shortness of breath and wheezing.    Cardiovascular: Negative for chest pain, palpitations and leg swelling.  "  Gastrointestinal: Negative for abdominal pain, nausea and vomiting.   Genitourinary: Negative for difficulty urinating.   Skin: Negative for color change and rash.   Neurological: Negative for dizziness and syncope.   Psychiatric/Behavioral: Negative for agitation. The patient is not nervous/anxious.        OBJECTIVE:     Vital Signs (Most Recent)  Pulse: 70 (11/10/17 0846)  BP: 131/84 (11/10/17 0846)  SpO2: 97 % (11/10/17 0846)  5' 3" (1.6 m)  72.7 kg (160 lb 4.4 oz)     Physical Exam:  Physical Exam   Constitutional: She is oriented to person, place, and time. She appears well-developed and well-nourished.   HENT:   Head: Normocephalic and atraumatic.   Eyes: EOM are normal.   Neck: Normal range of motion. Neck supple.   Cardiovascular: Normal rate, regular rhythm and normal heart sounds.    Pulmonary/Chest: Effort normal. She has decreased breath sounds (decreased air entry bilaterally). She has no wheezes.   Abdominal: Soft.   Musculoskeletal: Normal range of motion.   Neurological: She is alert and oriented to person, place, and time.   Skin: Skin is warm and dry.   Psychiatric: She has a normal mood and affect. Thought content normal.   Vitals reviewed.      Diagnostic Results:    2D echo 1/24/17:   CONCLUSIONS     1 - Normal left ventricular systolic function (EF 60-65%).     2 - Normal left ventricular diastolic function.     3 - Normal right ventricular systolic function .     4 - The estimated PA systolic pressure is 40 mmHg.     5 - Mild tricuspid regurgitation.     Chest CT 9/5/17:  2.2 x 1.3 cm soft tissue nodule in the medial aspect of the anterior segment of the left upper lobe concerning for primary lung neoplasm. Prompt consultation with pulmonology is recommended. Consider PET CT and soft tissue sampling.  Centrilobular emphysema.    IR biopsy 9/20/17:  NO NEOPLASIA IDENTIFIED  NECROTIC TISSUE WITH FIBROSIS  One part of the fibrous tissue is  vaguely reminiscent of nerve sheath tumor.    PET " 11/2/17:   Left upper lobe solid pulmonary nodule demonstrating no significant metabolic activity, favored to represent a benign process.    ASSESSMENT/PLAN:     56 y/o female former smoker with COPD, HTN, and incidentally found QUOC pulmonary nodule verus mediastinal mass - unclear where mass is arising from.     PLAN:Plan     2D echo reviewed.   Proceed to OR for left VATS for resection of lung mass versus mediastinal mass, possible thoracotomy on 11/30/17.   Appropriate patient education regarding the deric-operative period as well as intraperative details were discussed. Risks, including but not limited to, bleeding, infection, pain and anesthetic complication were discussed. Patient was given the opportunity to ask questions and to have those questions answered to their satisfaction. Patient verbalized understanding to both procedure and associated risks. Consent was obtained.    ATTENDING ATTESTATION:    I evaluated the patient and I agree with the assessment and plan.  I recommend an exploratory left VATS and mass resection.  I have discussed the technical aspects, risks and benefits of the procedure with the patient.  I did inform the patient that the risks are the most common risks and that there are other less likely risks that are too numerous to elaborate.  The patient is aware and has agreed to undergo the procedure as detailed on the consent form.

## 2017-12-01 VITALS
WEIGHT: 158 LBS | TEMPERATURE: 99 F | BODY MASS INDEX: 28 KG/M2 | HEART RATE: 97 BPM | SYSTOLIC BLOOD PRESSURE: 120 MMHG | HEIGHT: 63 IN | DIASTOLIC BLOOD PRESSURE: 56 MMHG | OXYGEN SATURATION: 94 % | RESPIRATION RATE: 20 BRPM

## 2017-12-01 DIAGNOSIS — R91.1 PULMONARY NODULE: Primary | ICD-10-CM

## 2017-12-01 LAB
ANION GAP SERPL CALC-SCNC: 8 MMOL/L
BASOPHILS # BLD AUTO: 0.01 K/UL
BASOPHILS NFR BLD: 0.1 %
BUN SERPL-MCNC: 6 MG/DL
CALCIUM SERPL-MCNC: 9 MG/DL
CHLORIDE SERPL-SCNC: 102 MMOL/L
CO2 SERPL-SCNC: 26 MMOL/L
CREAT SERPL-MCNC: 0.7 MG/DL
DIFFERENTIAL METHOD: ABNORMAL
EOSINOPHIL # BLD AUTO: 0.1 K/UL
EOSINOPHIL NFR BLD: 1.3 %
ERYTHROCYTE [DISTWIDTH] IN BLOOD BY AUTOMATED COUNT: 14 %
EST. GFR  (AFRICAN AMERICAN): >60 ML/MIN/1.73 M^2
EST. GFR  (NON AFRICAN AMERICAN): >60 ML/MIN/1.73 M^2
GLUCOSE SERPL-MCNC: 106 MG/DL
HCT VFR BLD AUTO: 33.1 %
HGB BLD-MCNC: 11.1 G/DL
IMM GRANULOCYTES # BLD AUTO: 0.03 K/UL
IMM GRANULOCYTES NFR BLD AUTO: 0.4 %
LYMPHOCYTES # BLD AUTO: 1.8 K/UL
LYMPHOCYTES NFR BLD: 22.4 %
MAGNESIUM SERPL-MCNC: 1.6 MG/DL
MCH RBC QN AUTO: 33.9 PG
MCHC RBC AUTO-ENTMCNC: 33.5 G/DL
MCV RBC AUTO: 101 FL
MONOCYTES # BLD AUTO: 0.6 K/UL
MONOCYTES NFR BLD: 7.3 %
NEUTROPHILS # BLD AUTO: 5.6 K/UL
NEUTROPHILS NFR BLD: 68.5 %
NRBC BLD-RTO: 0 /100 WBC
PHOSPHATE SERPL-MCNC: 2.2 MG/DL
PLATELET # BLD AUTO: 259 K/UL
PMV BLD AUTO: 9.8 FL
POTASSIUM SERPL-SCNC: 3.7 MMOL/L
RBC # BLD AUTO: 3.27 M/UL
SODIUM SERPL-SCNC: 136 MMOL/L
WBC # BLD AUTO: 8.21 K/UL

## 2017-12-01 PROCEDURE — 83735 ASSAY OF MAGNESIUM: CPT

## 2017-12-01 PROCEDURE — 80048 BASIC METABOLIC PNL TOTAL CA: CPT

## 2017-12-01 PROCEDURE — 25000003 PHARM REV CODE 250: Performed by: STUDENT IN AN ORGANIZED HEALTH CARE EDUCATION/TRAINING PROGRAM

## 2017-12-01 PROCEDURE — 25000003 PHARM REV CODE 250: Performed by: PHYSICIAN ASSISTANT

## 2017-12-01 PROCEDURE — 85025 COMPLETE CBC W/AUTO DIFF WBC: CPT

## 2017-12-01 PROCEDURE — 36415 COLL VENOUS BLD VENIPUNCTURE: CPT

## 2017-12-01 PROCEDURE — 63600175 PHARM REV CODE 636 W HCPCS: Performed by: SURGERY

## 2017-12-01 PROCEDURE — 84100 ASSAY OF PHOSPHORUS: CPT

## 2017-12-01 PROCEDURE — 25000003 PHARM REV CODE 250: Performed by: SURGERY

## 2017-12-01 RX ORDER — TRAMADOL HYDROCHLORIDE 50 MG/1
50 TABLET ORAL EVERY 6 HOURS PRN
Qty: 41 TABLET | Refills: 0 | Status: SHIPPED | OUTPATIENT
Start: 2017-12-01 | End: 2017-12-11

## 2017-12-01 RX ORDER — GUAIFENESIN 100 MG/5ML
200 SOLUTION ORAL EVERY 4 HOURS PRN
Status: DISCONTINUED | OUTPATIENT
Start: 2017-12-01 | End: 2017-12-01 | Stop reason: HOSPADM

## 2017-12-01 RX ORDER — LANOLIN ALCOHOL/MO/W.PET/CERES
800 CREAM (GRAM) TOPICAL DAILY
Status: COMPLETED | OUTPATIENT
Start: 2017-12-01 | End: 2017-12-01

## 2017-12-01 RX ORDER — OXYCODONE AND ACETAMINOPHEN 5; 325 MG/1; MG/1
1 TABLET ORAL EVERY 4 HOURS PRN
Qty: 21 TABLET | Refills: 0 | Status: SHIPPED | OUTPATIENT
Start: 2017-12-01 | End: 2018-04-26

## 2017-12-01 RX ADMIN — ENOXAPARIN SODIUM 40 MG: 100 INJECTION SUBCUTANEOUS at 09:12

## 2017-12-01 RX ADMIN — STANDARDIZED SENNA CONCENTRATE AND DOCUSATE SODIUM 1 TABLET: 8.6; 5 TABLET, FILM COATED ORAL at 09:12

## 2017-12-01 RX ADMIN — VALSARTAN 160 MG: 160 TABLET, FILM COATED ORAL at 06:12

## 2017-12-01 RX ADMIN — GUAIFENESIN 200 MG: 100 SOLUTION ORAL at 11:12

## 2017-12-01 RX ADMIN — DIBASIC SODIUM PHOSPHATE, MONOBASIC POTASSIUM PHOSPHATE AND MONOBASIC SODIUM PHOSPHATE 2 TABLET: 852; 155; 130 TABLET ORAL at 09:12

## 2017-12-01 RX ADMIN — TRAMADOL HYDROCHLORIDE 100 MG: 50 TABLET, FILM COATED ORAL at 09:12

## 2017-12-01 RX ADMIN — MAGNESIUM OXIDE TAB 400 MG (241.3 MG ELEMENTAL MG) 800 MG: 400 (241.3 MG) TAB at 09:12

## 2017-12-01 RX ADMIN — MONTELUKAST SODIUM 10 MG: 10 TABLET, FILM COATED ORAL at 06:12

## 2017-12-01 NOTE — HOSPITAL COURSE
11/30/17- Left VATS with left upper lobe wedge resection. Level 5 mediastinal lymph node excisional biopsy  12/1/17-Postop nausea resolved. Tolerating diet. Pain well controlled with Tramadol. Ambulating in room independently. Chest tube removed. Repeat CXR stable for discharge.

## 2017-12-01 NOTE — SUBJECTIVE & OBJECTIVE
Interval History: NAEON. Nausea with narcotic pain medication. Tolerating clears. Complains of cough overnight. Adequate UOP. Requiring minimal supplemental O2.     Medications:  Continuous Infusions:   Scheduled Meds:   enoxparin  40 mg Subcutaneous Q24H    fluticasone-vilanterol  1 puff Inhalation Daily    k phos di & mono-sod phos mono  500 mg Oral Once    lidocaine  1 patch Transdermal Q24H    magnesium oxide  800 mg Oral Daily    montelukast  10 mg Oral QAM    senna-docusate 8.6-50 mg  1 tablet Oral BID    tiotropium  18 mcg Inhalation Daily    valsartan  160 mg Oral QAM     PRN Meds:albuterol, HYDROmorphone, ondansetron, promethazine, sodium chloride 0.9%, traMADol, traMADol     Review of patient's allergies indicates:   Allergen Reactions    Ace inhibitors      Objective:     Vital Signs (Most Recent):  Temp: 98.2 °F (36.8 °C) (12/01/17 0438)  Pulse: 80 (12/01/17 0438)  Resp: 18 (12/01/17 0438)  BP: (!) 112/58 (12/01/17 0438)  SpO2: 95 % (Simultaneous filing. User may not have seen previous data.) (12/01/17 0438) Vital Signs (24h Range):  Temp:  [97.8 °F (36.6 °C)-98.7 °F (37.1 °C)] 98.2 °F (36.8 °C)  Pulse:  [] 80  Resp:  [14-23] 18  SpO2:  [89 %-98 %] 95 %  BP: (107-129)/(55-70) 112/58     Intake/Output - Last 3 Shifts       11/29 0700 - 11/30 0659 11/30 0700 - 12/01 0659 12/01 0700 - 12/02 0659    P.O.  540     I.V. (mL/kg)  1745.8 (24.3)     IV Piggyback  50     Total Intake(mL/kg)  2335.8 (32.6)     Urine (mL/kg/hr)  950 (0.6) 600 (14.4)    Emesis/NG output  0 (0)     Stool  0 (0)     Chest Tube  260 (0.2)     Total Output   1210 600    Net   +1125.8 -600           Stool Occurrence  0 x     Emesis Occurrence  0 x           SpO2: 95 % (Simultaneous filing. User may not have seen previous data.)  O2 Device (Oxygen Therapy): nasal cannula (Simultaneous filing. User may not have seen previous data.)    Physical Exam   Constitutional: She is oriented to person, place, and time. She appears  well-developed and well-nourished.   HENT:   Head: Normocephalic and atraumatic.   Eyes: Pupils are equal, round, and reactive to light.   Neck: Normal range of motion. Neck supple.   Cardiovascular: Normal rate, regular rhythm, normal heart sounds and intact distal pulses.    Pulmonary/Chest: Effort normal. She has decreased breath sounds in the right lower field and the left lower field. She has no wheezes.   Abdominal: Soft. Bowel sounds are normal. She exhibits no distension. There is no tenderness.   Musculoskeletal: Normal range of motion. She exhibits no edema.   Lymphadenopathy:     She has no cervical adenopathy.   Neurological: She is oriented to person, place, and time.   Skin: Skin is warm.   Psychiatric: She has a normal mood and affect.       Significant Labs:  CBC:   Recent Labs  Lab 12/01/17 0524   WBC 8.21   RBC 3.27*   HGB 11.1*   HCT 33.1*      *   MCH 33.9*   MCHC 33.5     CMP:   Recent Labs  Lab 12/01/17 0524      CALCIUM 9.0      K 3.7   CO2 26      BUN 6   CREATININE 0.7     Coagulation: No results for input(s): INR, APTT in the last 48 hours.    Invalid input(s): PT    Significant Diagnostics:  CXR: I have reviewed all pertinent results/findings within the past 24 hours    VTE Risk Mitigation         Ordered     enoxaparin injection 40 mg  Every 24 hours (non-standard times)     Route:  Subcutaneous        11/30/17 0944     Medium Risk of VTE  Once      11/30/17 0944

## 2017-12-01 NOTE — PROGRESS NOTES
[facility]  Thoracic Surgery  Progress Note    Subjective:     History of Present Illness:  56 y/o female former smoker with COPD, HTN, and incidentally found QUOC pulmonary nodule versus mediastinal mass. Incidentally found on CXR which prompted Chest CT which revealed a 2.2 x 1.3 cm soft tissue nodule in the medial aspect of the anterior segment of the left upper lobe. IR biopsy - no neoplasia, necrotic tissue with fibrosis. PET with minimal SUV max 1.9. She is active at work but requires inhaler prn in addition to controller meds. Occasional chest tightness and wheezing contributed to COPD. Takes lasix 2-3 times per week for pulmonary edema which helps with SOB. Follows with Dr. Lopes. She denies fever, chills, CP, nausea, vomiting, weight changes.       Former smoker. Quit 3 years ago. 35 pack years  PSH: DNC, shoulder biopsy.   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        Post-Op Info:  Procedure(s) (LRB):  THORACOSCOPY-VIDEO ASSISTED (VATS) W/WEDGE LUNG RESECTION (Left)   1 Day Post-Op     Interval History: NAEON. Nausea with narcotic pain medication. Tolerating clears. Complains of cough overnight. Adequate UOP. Requiring minimal supplemental O2.     Medications:  Continuous Infusions:   Scheduled Meds:   enoxparin  40 mg Subcutaneous Q24H    fluticasone-vilanterol  1 puff Inhalation Daily    k phos di & mono-sod phos mono  500 mg Oral Once    lidocaine  1 patch Transdermal Q24H    magnesium oxide  800 mg Oral Daily    montelukast  10 mg Oral QAM    senna-docusate 8.6-50 mg  1 tablet Oral BID    tiotropium  18 mcg Inhalation Daily    valsartan  160 mg Oral QAM     PRN Meds:albuterol, HYDROmorphone, ondansetron, promethazine, sodium chloride 0.9%, traMADol, traMADol     Review of patient's allergies indicates:   Allergen Reactions    Ace inhibitors      Objective:     Vital Signs (Most Recent):  Temp: 98.2 °F (36.8 °C) (12/01/17 0438)  Pulse: 80 (12/01/17 0438)  Resp: 18  (12/01/17 0438)  BP: (!) 112/58 (12/01/17 0438)  SpO2: 95 % (Simultaneous filing. User may not have seen previous data.) (12/01/17 0438) Vital Signs (24h Range):  Temp:  [97.8 °F (36.6 °C)-98.7 °F (37.1 °C)] 98.2 °F (36.8 °C)  Pulse:  [] 80  Resp:  [14-23] 18  SpO2:  [89 %-98 %] 95 %  BP: (107-129)/(55-70) 112/58     Intake/Output - Last 3 Shifts       11/29 0700 - 11/30 0659 11/30 0700 - 12/01 0659 12/01 0700 - 12/02 0659    P.O.  540     I.V. (mL/kg)  1745.8 (24.3)     IV Piggyback  50     Total Intake(mL/kg)  2335.8 (32.6)     Urine (mL/kg/hr)  950 (0.6) 600 (14.4)    Emesis/NG output  0 (0)     Stool  0 (0)     Chest Tube  260 (0.2)     Total Output   1210 600    Net   +1125.8 -600           Stool Occurrence  0 x     Emesis Occurrence  0 x           SpO2: 95 % (Simultaneous filing. User may not have seen previous data.)  O2 Device (Oxygen Therapy): nasal cannula (Simultaneous filing. User may not have seen previous data.)    Physical Exam   Constitutional: She is oriented to person, place, and time. She appears well-developed and well-nourished.   HENT:   Head: Normocephalic and atraumatic.   Eyes: Pupils are equal, round, and reactive to light.   Neck: Normal range of motion. Neck supple.   Cardiovascular: Normal rate, regular rhythm, normal heart sounds and intact distal pulses.    Pulmonary/Chest: Effort normal. She has decreased breath sounds in the right lower field and the left lower field. She has no wheezes.   Abdominal: Soft. Bowel sounds are normal. She exhibits no distension. There is no tenderness.   Musculoskeletal: Normal range of motion. She exhibits no edema.   Lymphadenopathy:     She has no cervical adenopathy.   Neurological: She is oriented to person, place, and time.   Skin: Skin is warm.   Psychiatric: She has a normal mood and affect.       Significant Labs:  CBC:   Recent Labs  Lab 12/01/17  0524   WBC 8.21   RBC 3.27*   HGB 11.1*   HCT 33.1*      *   MCH 33.9*   MCHC  33.5     CMP:   Recent Labs  Lab 12/01/17  0524      CALCIUM 9.0      K 3.7   CO2 26      BUN 6   CREATININE 0.7     Coagulation: No results for input(s): INR, APTT in the last 48 hours.    Invalid input(s): PT    Significant Diagnostics:  CXR: I have reviewed all pertinent results/findings within the past 24 hours    VTE Risk Mitigation         Ordered     enoxaparin injection 40 mg  Every 24 hours (non-standard times)     Route:  Subcutaneous        11/30/17 0944     Medium Risk of VTE  Once      11/30/17 0944        Assessment/Plan:     * Mass of left lung    57 year old female POD1 s/p left VATS for left upper lobe wedge resection     - Regular diet   - Continue Tramadol for pain.  - Pulmonary toilet- duo nebs and IS  - Chest tube to water seal. Will discuss removal this afternoon.   - PT/OT  - Wean NC O2 for saturations above 90%              MEÑO Stephenson  Thoracic Surgery  [facility]

## 2017-12-01 NOTE — ASSESSMENT & PLAN NOTE
57 year old female POD1 s/p left VATS for left upper lobe wedge resection     - Regular diet   - Continue Tramadol for pain.  - Pulmonary toilet- duo nebs and IS  - Chest tube to water seal. Will discuss removal this afternoon.   - PT/OT  - Wean NC O2 for saturations above 90%

## 2017-12-01 NOTE — PLAN OF CARE
12/01/17 1448   Final Note   Assessment Type Final Discharge Note   Discharge Disposition Home   Hospital Follow Up  Appt(s) scheduled? Yes   Discharge plans and expectations educations in teach back method with documentation complete? Yes   Right Care Referral Info   Post Acute Recommendation No Care

## 2017-12-01 NOTE — PLAN OF CARE
Problem: Patient Care Overview  Goal: Plan of Care Review  Outcome: Revised  POC reviewed with patient who verbalized understanding. VSS with 1 L NC. AAOX4. Remains free of falls and injury. LT chest tube intact and patent to low continuous suction - put out 96 ml of serosanguinous drainage. LT chest dressing X 3 all clean, dry and intact. Changed chest tube dressing today with gauze and tegaderm- tolerated well. Lidocaine placed near chest tube and anterior chest (pt request).  Tolerating clear liquid diet, denies nausea now. Upon arrival, patient was very nauseated - phenergan helped with that nausea. Pain controlled with PRN medications per MAR. Educated on IS use. Patient denies chest pain & SOB. TEDS and SCDS in place. No acute events. No distress noted. Bed in lowest position, call light within reach, frequent rounds made for safety.

## 2017-12-01 NOTE — DISCHARGE SUMMARY
[facility]  Thoracic Surgery  Discharge Summary    Patient Name: Diana Sellers  MRN: [id number]  Admission Date: 11/30/2017  Hospital Length of Stay: 1 days  Discharge Date and Time: No discharge date for patient encounter.  Attending Physician: Dinesh Ramirez MD   Discharging Provider: MEÑO Stephenson  Primary Care Provider: Dinesh Lou MD    HPI:   56 y/o female former smoker with COPD, HTN, and incidentally found QUOC pulmonary nodule versus mediastinal mass. Incidentally found on CXR which prompted Chest CT which revealed a 2.2 x 1.3 cm soft tissue nodule in the medial aspect of the anterior segment of the left upper lobe. IR biopsy - no neoplasia, necrotic tissue with fibrosis. PET with minimal SUV max 1.9. She is active at work but requires inhaler prn in addition to controller meds. Occasional chest tightness and wheezing contributed to COPD. Takes lasix 2-3 times per week for pulmonary edema which helps with SOB. Follows with Dr. Lopes. She denies fever, chills, CP, nausea, vomiting, weight changes.       Former smoker. Quit 3 years ago. 35 pack years  PSH: DNC, shoulder biopsy.   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        Procedure(s) (LRB):  THORACOSCOPY-VIDEO ASSISTED (VATS) W/WEDGE LUNG RESECTION (Left)      Hospital Course: 11/30/17- Left VATS with left upper lobe wedge resection. Level 5 mediastinal lymph node excisional biopsy  12/1/17-Postop nausea resolved. Tolerating diet. Pain well controlled with Tramadol. Ambulating in room independently. Chest tube removed. Repeat CXR stable for discharge.         Significant Diagnostic Studies: Radiology: X-Ray: CXR: X-Ray Chest 1 View (CXR):   Results for orders placed or performed during the hospital encounter of 11/30/17   X-Ray Chest 1 View    Narrative    One view: Heart size is normal. There is a left chest tube. There is a small left pneumothorax. There is mild bibasal discoid atelectasis.      Electronically signed  by: ANTONY VALENCIA MD  Date:     12/01/17  Time:    10:55     and X-Ray Chest PA and Lateral (CXR): No results found for this visit on 11/30/17.    Pending Diagnostic Studies:     Procedure Component Value Units Date/Time    X-Ray Chest 1 View [[id number]] Updated:  12/01/17 1327    Order Status:  Sent Lab Status:  In process         Final Active Diagnoses:    Diagnosis Date Noted POA    PRINCIPAL PROBLEM:  Mass of left lung [R91.8] 11/30/2017 Yes      Problems Resolved During this Admission:    Diagnosis Date Noted Date Resolved POA      Discharged Condition: good    Disposition:     Follow Up:    Patient Instructions:   No discharge procedures on file.  Medications:  Reconciled Home Medications:   Current Discharge Medication List      START taking these medications    Details   oxyCODONE-acetaminophen (PERCOCET) 5-325 mg per tablet Take 1 tablet by mouth every 4 (four) hours as needed for Pain.  Qty: 21 tablet, Refills: 0      traMADol (ULTRAM) 50 mg tablet Take 1 tablet (50 mg total) by mouth every 6 (six) hours as needed.  Qty: 41 tablet, Refills: 0         CONTINUE these medications which have NOT CHANGED    Details   albuterol (VENTOLIN HFA) 90 mcg/actuation inhaler Inhale 2 puffs into the lungs every 6 hours as needed for wheezing.  Qty: 18 g, Refills: 11    Associated Diagnoses: Chronic obstructive pulmonary disease, unspecified COPD type      albuterol-ipratropium 2.5mg-0.5mg/3mL (DUO-NEB) 0.5 mg-3 mg(2.5 mg base)/3 mL nebulizer solution Take 3 mLs by nebulization every 6 (six) hours as needed.  Qty: 90 vial, Refills: 3    Associated Diagnoses: Asthma, currently active      fluticasone-salmeterol 500-50 mcg/dose (ADVAIR DISKUS) 500-50 mcg/dose DsDv diskus inhaler Inhale 1 puff into the lungs 2 (two) times daily. Controller  Qty: 60 each, Refills: 11      montelukast (SINGULAIR) 10 mg tablet Take 1 tablet (10 mg total) by mouth once daily.  Qty: 30 tablet, Refills: 11    Associated Diagnoses: Chronic  obstructive pulmonary disease, unspecified COPD type      propranolol (INDERAL) 10 MG tablet Take 1 tablet (10 mg total) by mouth every 6 (six) hours as needed (tremors).  Qty: 90 tablet, Refills: 11    Associated Diagnoses: Essential tremor      tiotropium (SPIRIVA) 18 mcg inhalation capsule Inhale 1 capsule (18 mcg total) into the lungs once daily.  Qty: 90 capsule, Refills: 3    Associated Diagnoses: Chronic obstructive pulmonary disease, unspecified COPD type      valsartan (DIOVAN) 160 MG tablet Take 1 tablet (160 mg total) by mouth once daily.  Qty: 90 tablet, Refills: 3    Associated Diagnoses: Essential hypertension      furosemide (LASIX) 40 MG tablet Take 1 tablet (40 mg total) by mouth daily as needed (edema).  Qty: 30 tablet, Refills: 11    Associated Diagnoses: Edema, unspecified type      inhalation device (AEROCHAMBER PLUS FLOW-VU) Use as directed for inhalation.  Qty: 1 Device, Refills: 0      magnesium oxide (MAG-OX) 250 mg Tab Take 250 mg by mouth daily as needed.             MEÑO Stephenson  Thoracic Surgery  [facility]

## 2017-12-01 NOTE — PLAN OF CARE
Problem: Patient Care Overview  Goal: Plan of Care Review  Outcome: Ongoing (interventions implemented as appropriate)  No acute events overnight besides persistent cough. Requesting mucinex. Alert and oriented when awake, slept between care. Vitals stable and within patient's baseline since admission on 1L nasal cannula. Left chest tube dressing clean dry and intact, chest tube to wall suction. All connections secure. Up independently to bedside commode, urine output adequate. Pain controlled overnight. Instructed to call for help as needed, call light in reach. Bed in lowest position and brake set. Continuing to monitor.

## 2017-12-01 NOTE — PLAN OF CARE
Dinesh Lou MD     Extended Emergency Contact Information  Primary Emergency Contact: Darleen Sellers  Address: UNKNOWN   [place]  Mobile Phone: [phone]  Relation: Daughter     Payor: BLUE CROSS [employer] EMPLOYEE BENEFIT / Plan: BLUE CROSS [employer] EMPLOYEE / Product Type: Self Funded /         12/01/17 1447   Discharge Assessment   Assessment Type Discharge Planning Assessment   Confirmed/corrected address and phone number on facesheet? Yes   Assessment information obtained from? Patient   Expected Length of Stay (days) 2   Communicated expected length of stay with patient/caregiver yes   Prior to hospitilization cognitive status: Alert/Oriented   Prior to hospitalization functional status: Independent   Current cognitive status: Alert/Oriented   Current Functional Status: Independent   Lives With alone   Able to Return to Prior Arrangements yes   Is patient able to care for self after discharge? Yes   Patient's perception of discharge disposition home or selfcare   Readmission Within The Last 30 Days no previous admission in last 30 days   Patient currently being followed by outpatient case management? No   Patient currently receives any other outside agency services? No   Equipment Currently Used at Home none   Do you have any problems affording any of your prescribed medications? No   Is the patient taking medications as prescribed? yes   Does the patient have transportation home? Yes   Transportation Available family or friend will provide   Dialysis Name and Scheduled days n/a   Does the patient receive services at the [facility]? No   Discharge Plan A Home   Discharge Plan B Home;Home Health   Patient/Family In Agreement With Plan yes

## 2017-12-02 NOTE — PROGRESS NOTES
Discharge instructions reviewed with patient. Prescriptions reviewed and given to patient. Changed dressing to the chest tube incision. Pt verbalized understanding. All questions answered. PIV d'c'd with cath tip intact and discarded. Pt is being transported by spouse via wheel chair.

## 2017-12-04 ENCOUNTER — TELEPHONE (OUTPATIENT)
Dept: CARDIOTHORACIC SURGERY | Facility: CLINIC | Age: 57
End: 2017-12-04

## 2017-12-04 NOTE — TELEPHONE ENCOUNTER
Called to check on the pt after hospital d/c, pt is recovering well at home. She reports that her pain is well controlled with Tramadol, she has only required 3 doses. Reviewed shower instructions and she is agreeable to call with any needs. F/u on 12/20 with CXR, she will call next week to see if there is an available appt sooner.

## 2017-12-06 DIAGNOSIS — J45.909 ASTHMA, CURRENTLY ACTIVE: ICD-10-CM

## 2017-12-06 RX ORDER — IPRATROPIUM BROMIDE AND ALBUTEROL SULFATE 2.5; .5 MG/3ML; MG/3ML
3 SOLUTION RESPIRATORY (INHALATION) EVERY 6 HOURS PRN
Qty: 90 VIAL | Refills: 3 | Status: SHIPPED | OUTPATIENT
Start: 2017-12-06 | End: 2018-05-23

## 2017-12-08 DIAGNOSIS — J44.9 CHRONIC OBSTRUCTIVE PULMONARY DISEASE, UNSPECIFIED COPD TYPE: ICD-10-CM

## 2017-12-08 NOTE — TELEPHONE ENCOUNTER
----- Message from Selina Farley sent at 12/8/2017  1:50 PM CST -----  Contact: self/[phone]  Patient called in regards needing to talk with Dr Lou medical assistant about tiotropium (SPIRIVA) 18 mcg inhalation capsule, 3 month supplies, [facility] [place] - [address] [address] [phone] (Phone)  [phone] (Fax)      Please call and advise.       Thank you!!!

## 2017-12-11 RX ORDER — TIOTROPIUM BROMIDE 18 UG/1
18 CAPSULE ORAL; RESPIRATORY (INHALATION) DAILY
Qty: 90 CAPSULE | Refills: 3 | Status: SHIPPED | OUTPATIENT
Start: 2017-12-11 | End: 2019-01-28

## 2017-12-12 ENCOUNTER — TELEPHONE (OUTPATIENT)
Dept: CARDIOTHORACIC SURGERY | Facility: CLINIC | Age: 57
End: 2017-12-12

## 2017-12-13 ENCOUNTER — HOSPITAL ENCOUNTER (OUTPATIENT)
Dept: RADIOLOGY | Facility: HOSPITAL | Age: 57
Discharge: HOME | End: 2017-12-13
Attending: PHYSICIAN ASSISTANT
Payer: COMMERCIAL

## 2017-12-13 ENCOUNTER — OFFICE VISIT (OUTPATIENT)
Dept: CARDIOTHORACIC SURGERY | Facility: CLINIC | Age: 57
End: 2017-12-13
Payer: COMMERCIAL

## 2017-12-13 VITALS
OXYGEN SATURATION: 98 % | HEIGHT: 63 IN | WEIGHT: 159.63 LBS | BODY MASS INDEX: 28.29 KG/M2 | SYSTOLIC BLOOD PRESSURE: 121 MMHG | HEART RATE: 75 BPM | DIASTOLIC BLOOD PRESSURE: 77 MMHG

## 2017-12-13 DIAGNOSIS — R91.1 NODULE OF LEFT LUNG: Primary | ICD-10-CM

## 2017-12-13 DIAGNOSIS — R91.1 PULMONARY NODULE: ICD-10-CM

## 2017-12-13 DIAGNOSIS — R91.1 PULMONARY NODULE, LEFT: Primary | ICD-10-CM

## 2017-12-13 PROCEDURE — 71020 XR CHEST PA AND LATERAL: CPT | Mod: TC

## 2017-12-13 PROCEDURE — 99999 PR PBB SHADOW E&M-EST. PATIENT-LVL III: CPT | Mod: PBBFAC,,, | Performed by: THORACIC SURGERY (CARDIOTHORACIC VASCULAR SURGERY)

## 2017-12-13 PROCEDURE — 71020 XR CHEST PA AND LATERAL: CPT | Mod: 26,,, | Performed by: RADIOLOGY

## 2017-12-13 PROCEDURE — 99024 POSTOP FOLLOW-UP VISIT: CPT | Mod: S$GLB,,, | Performed by: THORACIC SURGERY (CARDIOTHORACIC VASCULAR SURGERY)

## 2018-01-05 DIAGNOSIS — J44.9 CHRONIC OBSTRUCTIVE PULMONARY DISEASE, UNSPECIFIED COPD TYPE: ICD-10-CM

## 2018-01-05 RX ORDER — MONTELUKAST SODIUM 10 MG/1
10 TABLET ORAL DAILY
Qty: 30 TABLET | Refills: 1 | Status: SHIPPED | OUTPATIENT
Start: 2018-01-05 | End: 2018-03-28 | Stop reason: SDUPTHER

## 2018-01-05 RX ORDER — ALBUTEROL SULFATE 90 UG/1
AEROSOL, METERED RESPIRATORY (INHALATION)
Qty: 18 G | Refills: 1 | Status: SHIPPED | OUTPATIENT
Start: 2018-01-05 | End: 2018-07-18

## 2018-01-23 ENCOUNTER — PATIENT MESSAGE (OUTPATIENT)
Dept: ADMINISTRATIVE | Facility: OTHER | Age: 58
End: 2018-01-23

## 2018-03-28 DIAGNOSIS — J44.9 CHRONIC OBSTRUCTIVE PULMONARY DISEASE, UNSPECIFIED COPD TYPE: ICD-10-CM

## 2018-03-28 RX ORDER — MONTELUKAST SODIUM 10 MG/1
10 TABLET ORAL DAILY
Qty: 30 TABLET | Refills: 1 | Status: SHIPPED | OUTPATIENT
Start: 2018-03-28 | End: 2018-06-06

## 2018-04-05 ENCOUNTER — TELEPHONE (OUTPATIENT)
Dept: INTERNAL MEDICINE | Facility: CLINIC | Age: 58
End: 2018-04-05

## 2018-04-05 ENCOUNTER — NURSE TRIAGE (OUTPATIENT)
Dept: ADMINISTRATIVE | Facility: CLINIC | Age: 58
End: 2018-04-05

## 2018-04-05 NOTE — TELEPHONE ENCOUNTER
Reason for Disposition   MODERATE difficulty breathing (e.g., speaks in phrases, SOB even at rest, pulse 100-120) of new onset or worse than normal    Protocols used: ST BREATHING DIFFICULTY-A-OH    Ms. Sellers states she is experiencing sob that is worsening and lightheadedness. Patient advised to go to the ED. She refused, patient is requesting an appointment to see Dr. Lou.

## 2018-04-05 NOTE — TELEPHONE ENCOUNTER
Spoke to pt she sd she saw Dr Salmeron today he gave her a steroid  She is fine made Dr Lou aware

## 2018-04-05 NOTE — TELEPHONE ENCOUNTER
Pt was advised to go to the ED however pt refused   Wanted to come see you.  Before I call her Do you want me to tell her to go to ED?

## 2018-04-06 ENCOUNTER — OFFICE VISIT (OUTPATIENT)
Dept: INTERNAL MEDICINE | Facility: CLINIC | Age: 58
End: 2018-04-06
Payer: COMMERCIAL

## 2018-04-06 ENCOUNTER — HOSPITAL ENCOUNTER (OUTPATIENT)
Dept: RADIOLOGY | Facility: HOSPITAL | Age: 58
Discharge: HOME | End: 2018-04-06
Attending: INTERNAL MEDICINE
Payer: COMMERCIAL

## 2018-04-06 VITALS
HEART RATE: 69 BPM | BODY MASS INDEX: 29.38 KG/M2 | DIASTOLIC BLOOD PRESSURE: 82 MMHG | WEIGHT: 165.81 LBS | OXYGEN SATURATION: 96 % | HEIGHT: 63 IN | TEMPERATURE: 98 F | SYSTOLIC BLOOD PRESSURE: 143 MMHG

## 2018-04-06 DIAGNOSIS — R05.9 COUGH: Primary | ICD-10-CM

## 2018-04-06 DIAGNOSIS — R05.9 COUGH: ICD-10-CM

## 2018-04-06 DIAGNOSIS — R06.09 DYSPNEA ON EXERTION: ICD-10-CM

## 2018-04-06 PROCEDURE — 99999 PR PBB SHADOW E&M-EST. PATIENT-LVL IV: CPT | Mod: PBBFAC,,, | Performed by: INTERNAL MEDICINE

## 2018-04-06 PROCEDURE — 99214 OFFICE O/P EST MOD 30 MIN: CPT | Mod: S$GLB,,, | Performed by: INTERNAL MEDICINE

## 2018-04-06 PROCEDURE — 93010 ELECTROCARDIOGRAM REPORT: CPT | Mod: S$GLB,,, | Performed by: INTERNAL MEDICINE

## 2018-04-06 PROCEDURE — 71046 X-RAY EXAM CHEST 2 VIEWS: CPT | Mod: TC

## 2018-04-06 PROCEDURE — 3079F DIAST BP 80-89 MM HG: CPT | Mod: CPTII,S$GLB,, | Performed by: INTERNAL MEDICINE

## 2018-04-06 PROCEDURE — 3077F SYST BP >= 140 MM HG: CPT | Mod: CPTII,S$GLB,, | Performed by: INTERNAL MEDICINE

## 2018-04-06 PROCEDURE — 93005 ELECTROCARDIOGRAM TRACING: CPT | Mod: S$GLB,,, | Performed by: INTERNAL MEDICINE

## 2018-04-06 PROCEDURE — 71046 X-RAY EXAM CHEST 2 VIEWS: CPT | Mod: 26,,, | Performed by: RADIOLOGY

## 2018-04-06 RX ORDER — AZITHROMYCIN 250 MG/1
TABLET, FILM COATED ORAL
Qty: 6 TABLET | Refills: 0 | Status: SHIPPED | OUTPATIENT
Start: 2018-04-06 | End: 2018-04-11

## 2018-04-06 NOTE — PROGRESS NOTES
Subjective:       Patient ID: Diana Sellers is a 57 y.o. female.    Chief Complaint: Bronchitis    HPI  58 y/o woman with COPD here for urgent visit for bronchitis, dyspnea.    Cough x 3 weeks, some congestion but cough was worse. No fevers. Coughing up some sputum. Using advair BID. Using albuterol inhaler around q4-6hours, then using her nebulizer. Taking spiriva but not every day. Taking mucinex.  Taking cetirizine daily. Taking singulair daily. Not using steroid nasal spray.   Called in to [facility] yesterday complaining of worsening dyspnea and lightheadedness. Patient was advised to go to the ER; she instead was given a steroid by a different provider.  Started on medrol dosepak yesterday. Started feeling better but still feeling significant short of breath on exertion. Doesn't feel chest pain, chest tightness, or wheezing. Did neb treatment at her work across the street 2 hrs ago.     Has been taking lasix daily as this sometimes helps her shortness of breath; her echo was done 1/2017 and EF and diastolic function were normal. No leg swelling or abdominal distention. Has noted ~2 pound weight gain in recent few days. Weight up ~6# since last visit 12/13/17.    She is s/p VATS 11/30/17 for pulmonary nodule, which on path showed no malignancy - necrotic mass with granulomatous/giant cell inflammation. CXR 2 weeks after procedure did show hydropneumothorax with air-fluid level which was stable; was recommended for follow up with CT in May 2018.    Review of Systems   Constitutional: Negative for fever.   HENT: Positive for congestion (mild). Negative for sinus pain and sore throat.    Eyes: Negative for visual disturbance.   Respiratory: Positive for cough, shortness of breath and wheezing (not her typical, as noted).    Cardiovascular: Negative for chest pain and leg swelling.   Gastrointestinal: Negative for abdominal distention, abdominal pain and nausea.   Genitourinary: Negative.    Musculoskeletal: Negative.  "   Skin: Negative.    Neurological: Positive for light-headedness. Negative for weakness.   Psychiatric/Behavioral: Negative.          Past medical history, surgical history, and family medical history reviewed and updated as appropriate.    Medications and allergies reviewed.     Objective:          Vitals:    04/06/18 1542   BP: (!) 143/82   BP Location: Left arm   Patient Position: Sitting   Pulse: 69   Temp: 98.4 °F (36.9 °C)   TempSrc: Oral   SpO2: 96%   Weight: 75.2 kg (165 lb 12.6 oz)   Height: 5' 3" (1.6 m)     SaO2 96-97% at rest, drops to 91% with walking 50-100ft    Body mass index is 29.37 kg/m².  Physical Exam   Constitutional: She is oriented to person, place, and time. She appears well-developed and well-nourished. No distress.   HENT:   Head: Normocephalic and atraumatic.   Mouth/Throat: Oropharynx is clear and moist.   Eyes: Conjunctivae and EOM are normal.   Neck: Neck supple.   Cardiovascular: Normal rate, regular rhythm and normal heart sounds.    No murmur heard.  Pulmonary/Chest: Effort normal. No respiratory distress. She has no wheezes. She has no rales.   Slight prolonged expiratory phase but no wheezes, rales, rhonchi. No areas of decreased air movement.   Speaking in full sentences  Dry cough occasionally   Abdominal: Soft. There is no tenderness.   Musculoskeletal: She exhibits no edema or tenderness.   Neurological: She is alert and oriented to person, place, and time. No cranial nerve deficit. Gait normal.   Skin: Skin is warm and dry. She is not diaphoretic.   Psychiatric: She has a normal mood and affect.   Vitals reviewed.      Lab Results   Component Value Date    WBC 8.21 12/01/2017    HGB 11.1 (L) 12/01/2017    HCT 33.1 (L) 12/01/2017     12/01/2017    CHOL 219 (H) 07/05/2016    TRIG 118 07/05/2016    HDL 93 (H) 07/05/2016    ALT 17 07/05/2016    AST 25 07/05/2016     12/01/2017    K 3.7 12/01/2017     12/01/2017    CREATININE 0.7 12/01/2017    BUN 6 12/01/2017 "    CO2 26 12/01/2017    TSH 2.034 07/29/2014    INR 1.0 09/20/2017    HGBA1C 5.6 07/19/2013       Assessment:       1. Cough    2. Dyspnea on exertion        Plan:   Diana was seen today for bronchitis.    Diagnoses and all orders for this visit:    Cough, Dyspnea on exertion  -     X-Ray Chest PA And Lateral; Future  -     IN OFFICE EKG 12-LEAD (to Muse)  -     azithromycin (Z-IVORY) 250 MG tablet; Take 2 tablets by mouth on day 1; Take 1 tablet by mouth on days 2-5    EKG reviewed - no ST changes, NSR.   Unclear cause; may be COPD exacerbation but drop in SaO2 is out of proportion to what would be expected given no significant wheezing or decreased air movement on exam.   Did have air-fluid level on last CXR after surgery.   Will treat with azithromycin + continue PO steroids for treatment for COPD exacerbation; checking CXR today with further treatment/evaluation based on these results. If no clear changes on CXR would recommend close follow up with Pulmonology.  Discussed with Dr Lou in clinic as well.   Return / ER precautions given.    Follow-up if symptoms worsen or fail to improve.    Tc Landers MD  Internal Medicine  [facility]  4/6/2018

## 2018-04-06 NOTE — PROGRESS NOTES
Reviewed CXR result - no acute process, no air-fluid level noted, no sign of PNA or pneumothorax.   Called patient, given this recommended continue tx for COPD exacerbation with azithromycin, PO steroid, nebs/inhaler tx. If no improvement, call Pulmonology on Monday to schedule appt  If worsening over the weekend, she agrees to go to ER for further evaluation    Patient expressed understanding and agreement.  Tc Landers MD

## 2018-04-18 ENCOUNTER — TELEPHONE (OUTPATIENT)
Dept: INTERNAL MEDICINE | Facility: CLINIC | Age: 58
End: 2018-04-18

## 2018-04-18 NOTE — TELEPHONE ENCOUNTER
----- Message from Elizabeth Davila sent at 4/18/2018  3:19 PM CDT -----  Contact: Patient [phone]  Need fasting lab orders put in before appt with you.    Please call and advise.    Thank You

## 2018-04-20 ENCOUNTER — LAB VISIT (OUTPATIENT)
Dept: LAB | Facility: HOSPITAL | Age: 58
End: 2018-04-20
Attending: INTERNAL MEDICINE
Payer: COMMERCIAL

## 2018-04-20 DIAGNOSIS — E83.42 HYPOMAGNESEMIA: ICD-10-CM

## 2018-04-20 DIAGNOSIS — E55.9 VITAMIN D INSUFFICIENCY: ICD-10-CM

## 2018-04-20 DIAGNOSIS — I10 ESSENTIAL HYPERTENSION: ICD-10-CM

## 2018-04-20 LAB
25(OH)D3+25(OH)D2 SERPL-MCNC: 9 NG/ML
ALBUMIN SERPL BCP-MCNC: 3.6 G/DL
ALP SERPL-CCNC: 87 U/L
ALT SERPL W/O P-5'-P-CCNC: 16 U/L
ANION GAP SERPL CALC-SCNC: 8 MMOL/L
AST SERPL-CCNC: 28 U/L
BILIRUB SERPL-MCNC: 0.4 MG/DL
BUN SERPL-MCNC: 6 MG/DL
CALCIUM SERPL-MCNC: 9 MG/DL
CHLORIDE SERPL-SCNC: 108 MMOL/L
CO2 SERPL-SCNC: 26 MMOL/L
CREAT SERPL-MCNC: 0.7 MG/DL
ERYTHROCYTE [DISTWIDTH] IN BLOOD BY AUTOMATED COUNT: 13.2 %
EST. GFR  (AFRICAN AMERICAN): >60 ML/MIN/1.73 M^2
EST. GFR  (NON AFRICAN AMERICAN): >60 ML/MIN/1.73 M^2
GLUCOSE SERPL-MCNC: 99 MG/DL
HCT VFR BLD AUTO: 37.8 %
HGB BLD-MCNC: 12.6 G/DL
MAGNESIUM SERPL-MCNC: 1.5 MG/DL
MCH RBC QN AUTO: 34.6 PG
MCHC RBC AUTO-ENTMCNC: 33.3 G/DL
MCV RBC AUTO: 104 FL
PLATELET # BLD AUTO: 260 K/UL
PMV BLD AUTO: 9.9 FL
POTASSIUM SERPL-SCNC: 4.2 MMOL/L
PROT SERPL-MCNC: 6.9 G/DL
RBC # BLD AUTO: 3.64 M/UL
SODIUM SERPL-SCNC: 142 MMOL/L
TSH SERPL DL<=0.005 MIU/L-ACNC: 1.36 UIU/ML
WBC # BLD AUTO: 6.19 K/UL

## 2018-04-20 PROCEDURE — 82306 VITAMIN D 25 HYDROXY: CPT

## 2018-04-20 PROCEDURE — 83735 ASSAY OF MAGNESIUM: CPT

## 2018-04-20 PROCEDURE — 84443 ASSAY THYROID STIM HORMONE: CPT

## 2018-04-20 PROCEDURE — 80053 COMPREHEN METABOLIC PANEL: CPT

## 2018-04-20 PROCEDURE — 36415 COLL VENOUS BLD VENIPUNCTURE: CPT

## 2018-04-20 PROCEDURE — 85027 COMPLETE CBC AUTOMATED: CPT

## 2018-04-23 ENCOUNTER — PATIENT MESSAGE (OUTPATIENT)
Dept: INTERNAL MEDICINE | Facility: CLINIC | Age: 58
End: 2018-04-23

## 2018-04-23 DIAGNOSIS — R79.0 LOW MAGNESIUM LEVEL: ICD-10-CM

## 2018-04-23 DIAGNOSIS — E55.9 VITAMIN D DEFICIENCY: Primary | ICD-10-CM

## 2018-04-23 DIAGNOSIS — D75.89 MACROCYTOSIS: ICD-10-CM

## 2018-04-23 RX ORDER — ERGOCALCIFEROL 1.25 MG/1
50000 CAPSULE ORAL
Qty: 12 CAPSULE | Refills: 3 | Status: SHIPPED | OUTPATIENT
Start: 2018-04-23

## 2018-04-26 ENCOUNTER — OFFICE VISIT (OUTPATIENT)
Dept: INTERNAL MEDICINE | Facility: CLINIC | Age: 58
End: 2018-04-26
Payer: COMMERCIAL

## 2018-04-26 VITALS
WEIGHT: 162.25 LBS | DIASTOLIC BLOOD PRESSURE: 74 MMHG | HEIGHT: 63 IN | SYSTOLIC BLOOD PRESSURE: 112 MMHG | BODY MASS INDEX: 28.75 KG/M2 | HEART RATE: 73 BPM

## 2018-04-26 DIAGNOSIS — E53.8 VITAMIN B12 DEFICIENCY: ICD-10-CM

## 2018-04-26 DIAGNOSIS — R79.0 LOW MAGNESIUM LEVEL: ICD-10-CM

## 2018-04-26 DIAGNOSIS — E55.9 VITAMIN D DEFICIENCY: ICD-10-CM

## 2018-04-26 DIAGNOSIS — Z98.890 STATUS POST LUNG SURGERY: ICD-10-CM

## 2018-04-26 DIAGNOSIS — R06.02 SHORTNESS OF BREATH: Primary | ICD-10-CM

## 2018-04-26 PROCEDURE — 3074F SYST BP LT 130 MM HG: CPT | Mod: CPTII,S$GLB,, | Performed by: INTERNAL MEDICINE

## 2018-04-26 PROCEDURE — 3078F DIAST BP <80 MM HG: CPT | Mod: CPTII,S$GLB,, | Performed by: INTERNAL MEDICINE

## 2018-04-26 PROCEDURE — 99214 OFFICE O/P EST MOD 30 MIN: CPT | Mod: S$GLB,,, | Performed by: INTERNAL MEDICINE

## 2018-04-26 PROCEDURE — 99999 PR PBB SHADOW E&M-EST. PATIENT-LVL III: CPT | Mod: PBBFAC,,, | Performed by: INTERNAL MEDICINE

## 2018-04-26 NOTE — PROGRESS NOTES
Subjective:       Patient ID: Diana Sellers is a 57 y.o. female.    Chief Complaint: Follow-up    HPI    Last visit with me 10/2017. Since then seen by Pulmonary, CT surgery, Optometry, and Internal Medicine. Had surgery for left lung nodule with biopsy and VATS in Nov 2018.     Was having cough a few weeks ago, no wheezing. Had desats and shortness of breath. Activity more limited due to shortness of breath following lung resection. Is aware of Pulm Rehab, wasn't encouraged to see them.     Lasix only twice in last few weeks. Only used when left leg is swelling. Helps with breathing as well.     diarrhea chronic, going on all the time since 1990s.    Review of Systems   Constitutional: Positive for activity change. Negative for unexpected weight change.   HENT: Negative for hearing loss, rhinorrhea and trouble swallowing.    Eyes: Negative for discharge and visual disturbance.   Respiratory: Negative for chest tightness and wheezing.    Cardiovascular: Negative for chest pain and palpitations.   Gastrointestinal: Positive for diarrhea. Negative for blood in stool, constipation and vomiting.   Endocrine: Negative for polydipsia and polyuria.   Genitourinary: Negative for difficulty urinating, hematuria and menstrual problem.   Musculoskeletal: Negative for arthralgias, joint swelling and neck pain.   Neurological: Negative for weakness and headaches.   Psychiatric/Behavioral: Negative for confusion and dysphoric mood.       Objective:      Physical Exam   Constitutional: She is oriented to person, place, and time. No distress.    woman whose Body mass index is 28.74 kg/m².    HENT:   Mouth/Throat: Oropharynx is clear and moist. No oropharyngeal exudate.   Neck: No thyromegaly present.   Cardiovascular: Normal rate, regular rhythm and normal heart sounds.    Pulmonary/Chest: Effort normal and breath sounds normal. No stridor. She has no wheezes. She has no rales.   Lymphadenopathy:     She has no  "cervical adenopathy.   Neurological: She is alert and oriented to person, place, and time.   Psychiatric: She has a normal mood and affect. Her behavior is normal.   Nursing note and vitals reviewed.      Vitals:    04/26/18 0755   BP: 112/74   BP Location: Right arm   Patient Position: Sitting   BP Method: Large (Manual)   Pulse: 73   Weight: 73.6 kg (162 lb 4.1 oz)   Height: 5' 3" (1.6 m)     Body mass index is 28.74 kg/m².    RESULTS: Reviewed labs from last 12 months. Reviewed stress test and PET CT from last 6 mo.    Assessment:       1. Shortness of breath    2. Vitamin D deficiency    3. Low magnesium level    4. Status post lung surgery        Plan:   Diana was seen today for follow-up.    Diagnoses and all orders for this visit:    Shortness of breath:  Prior diagnosis, persistent problem going on since partial lung resection.  Also has history of COPD.  Continues to work on exercises to increase pulmonary tolerance.  Check labs to rule out any deficiencies that would contribute to shortness of breath.  -     Ferritin; Future  -     Iron and TIBC; Future    Vitamin D deficiency:  Prior diagnosis, she has been started on vitamin D supplements, check vitamin D level with next set of labs.  -     Vitamin D; Future    Low magnesium level:  Recent problems seen on labs, continue taking daily magnesium supplement whenever using Lasix.    Status post lung surgery:  Lung resection performed given suspicious nodule, pathology reported nodule was benign, continue follow-up with CT surgery in the future as needed.    Follow-up in about 6 months (around 10/26/2018) for EPP annual exam.  Dinesh Lou MD  Internal Medicine    Portions of this note were completed using [employer] dictation software. Please excuse typographical or syntax errors.   "

## 2018-05-07 DIAGNOSIS — R60.9 EDEMA, UNSPECIFIED TYPE: ICD-10-CM

## 2018-05-07 RX ORDER — FUROSEMIDE 40 MG/1
40 TABLET ORAL DAILY PRN
Qty: 30 TABLET | Refills: 11 | Status: CANCELLED | OUTPATIENT
Start: 2018-05-07 | End: 2019-05-07

## 2018-05-08 DIAGNOSIS — R60.9 EDEMA, UNSPECIFIED TYPE: ICD-10-CM

## 2018-05-08 RX ORDER — FUROSEMIDE 40 MG/1
40 TABLET ORAL DAILY PRN
Qty: 30 TABLET | Refills: 11 | Status: SHIPPED | OUTPATIENT
Start: 2018-05-08 | End: 2019-05-08

## 2018-05-23 DIAGNOSIS — J45.909 ASTHMA, CURRENTLY ACTIVE: ICD-10-CM

## 2018-05-23 RX ORDER — IPRATROPIUM BROMIDE AND ALBUTEROL SULFATE 2.5; .5 MG/3ML; MG/3ML
3 SOLUTION RESPIRATORY (INHALATION) EVERY 6 HOURS PRN
Qty: 270 ML | Refills: 3 | Status: SHIPPED | OUTPATIENT
Start: 2018-05-23

## 2018-06-06 DIAGNOSIS — J44.9 CHRONIC OBSTRUCTIVE PULMONARY DISEASE, UNSPECIFIED COPD TYPE: ICD-10-CM

## 2018-06-06 RX ORDER — MONTELUKAST SODIUM 10 MG/1
10 TABLET ORAL DAILY
Qty: 90 TABLET | Refills: 3 | Status: SHIPPED | OUTPATIENT
Start: 2018-06-06

## 2018-06-13 ENCOUNTER — HOSPITAL ENCOUNTER (OUTPATIENT)
Dept: RADIOLOGY | Facility: HOSPITAL | Age: 58
Discharge: HOME | End: 2018-06-13
Attending: THORACIC SURGERY (CARDIOTHORACIC VASCULAR SURGERY)
Payer: COMMERCIAL

## 2018-06-13 ENCOUNTER — OFFICE VISIT (OUTPATIENT)
Dept: CARDIOTHORACIC SURGERY | Facility: CLINIC | Age: 58
End: 2018-06-13
Attending: THORACIC SURGERY (CARDIOTHORACIC VASCULAR SURGERY)
Payer: COMMERCIAL

## 2018-06-13 VITALS
BODY MASS INDEX: 28.9 KG/M2 | WEIGHT: 163.13 LBS | HEART RATE: 88 BPM | SYSTOLIC BLOOD PRESSURE: 127 MMHG | DIASTOLIC BLOOD PRESSURE: 77 MMHG | HEIGHT: 63 IN

## 2018-06-13 DIAGNOSIS — R91.1 NODULE OF LEFT LUNG: ICD-10-CM

## 2018-06-13 DIAGNOSIS — R06.02 SHORTNESS OF BREATH: Primary | ICD-10-CM

## 2018-06-13 PROCEDURE — 71250 CT THORAX DX C-: CPT | Mod: 26,,, | Performed by: RADIOLOGY

## 2018-06-13 PROCEDURE — 3008F BODY MASS INDEX DOCD: CPT | Mod: CPTII,S$GLB,, | Performed by: THORACIC SURGERY (CARDIOTHORACIC VASCULAR SURGERY)

## 2018-06-13 PROCEDURE — 99213 OFFICE O/P EST LOW 20 MIN: CPT | Mod: S$GLB,,, | Performed by: THORACIC SURGERY (CARDIOTHORACIC VASCULAR SURGERY)

## 2018-06-13 PROCEDURE — 71250 CT THORAX DX C-: CPT | Mod: TC

## 2018-06-13 PROCEDURE — 99999 PR PBB SHADOW E&M-EST. PATIENT-LVL III: CPT | Mod: PBBFAC,,, | Performed by: THORACIC SURGERY (CARDIOTHORACIC VASCULAR SURGERY)

## 2018-06-13 PROCEDURE — 3078F DIAST BP <80 MM HG: CPT | Mod: CPTII,S$GLB,, | Performed by: THORACIC SURGERY (CARDIOTHORACIC VASCULAR SURGERY)

## 2018-06-13 PROCEDURE — 3074F SYST BP LT 130 MM HG: CPT | Mod: CPTII,S$GLB,, | Performed by: THORACIC SURGERY (CARDIOTHORACIC VASCULAR SURGERY)

## 2018-06-13 NOTE — PROGRESS NOTES
"Subjective:       Patient ID: Diana Sellers is a 57 y.o. female.    Chief Complaint: Follow-up    Diagnosis:  Lung nodule     Pre-operative therapy: N/A    Procedure(s) and date(s): 11/30/17- Left VATS for left upper lobe wedge resection     Pathology:  1. Left upper lobe wedge-   Negative for neoplasm  Necrotic mass with surrounding granulomatous and giant cell inflammation  No fungus (GMS stain)  No definite acid fast organisms (AFB stain)  Correlate with concurrently submitted microbiology cultures  2. Level V LN  Benign reactive lymph node     Post-operative therapy: pulmonology     HPI     57 year old female returns for follow up s/p left VATS left upper lobe wedge biopsy. Post-op course unremarkable. Pathology benign. Recent episode of worsening DAVALOS. Started on medrol dose pack with some improvement in breathing. Recent increase in life stressors with sick father. Denies fever, chills. SOB, cough, CP, N/V or changes in bowel and bladder functioning.     Review of Systems   Constitutional: Positive for fatigue. Negative for activity change, appetite change and fever.   HENT: Negative.  Negative for trouble swallowing and voice change.    Eyes: Negative.    Respiratory: Positive for cough and shortness of breath (Davalos). Negative for chest tightness and wheezing.    Cardiovascular: Negative.    Gastrointestinal: Negative.    Endocrine: Negative.    Genitourinary: Negative.    Musculoskeletal: Negative.    Skin: Negative.    Allergic/Immunologic: Negative.    Neurological: Negative.    Hematological: Negative.    Psychiatric/Behavioral: Negative.            Objective:       Vitals:    06/13/18 0833   BP: 127/77   Pulse: 88   Weight: 74 kg (163 lb 2.3 oz)   Height: 5' 3" (1.6 m)   PainSc: 0-No pain         Physical Exam   Constitutional: She is oriented to person, place, and time. She appears well-developed and well-nourished.   HENT:   Head: Normocephalic and atraumatic.   Mouth/Throat: Oropharynx is clear and " moist.   Eyes: EOM are normal. Pupils are equal, round, and reactive to light.   Neck: Normal range of motion. Neck supple. No tracheal deviation present.   Cardiovascular: Normal rate, regular rhythm, normal heart sounds and intact distal pulses.    Pulmonary/Chest: Effort normal. She has no wheezes. She has rhonchi in the right lower field and the left upper field. She exhibits no tenderness.   Abdominal: Soft. Bowel sounds are normal. She exhibits no distension.   Musculoskeletal: Normal range of motion. She exhibits no edema.   Lymphadenopathy:     She has no cervical adenopathy.   Neurological: She is alert and oriented to person, place, and time.   Skin: Skin is warm and dry.   Psychiatric: Thought content normal.   Vitals reviewed.      Chest CT 6/13/18: reviewed new disease identified.     Assessment:       57 year old female returns for follow up s/p left VATS left upper lobe wedge biopsy.     Plan:       Stable chest CT.  Refer back to pulmonology for further management of COPD.     ATTENDING ATTESTATION:    I evaluated the patient and I agree with the assessment and plan.  I reviewed the chest CT images and compared to the prior exam.  I see no new findings.  The resected nodule was c/w a granulomatous process.  I recommend a referral back to Pulmonary Medicine for management of chronic dyspnea.  The patient was emotional this morning and indicated that she has a number of stressors.  She recently placed her father into hospice care.  I suggested that she consider seeking therapy for emotional support a number of times during today's visit.  The patient thanked me but declined.

## 2018-06-21 ENCOUNTER — LAB VISIT (OUTPATIENT)
Dept: LAB | Facility: HOSPITAL | Age: 58
End: 2018-06-21
Attending: INTERNAL MEDICINE
Payer: COMMERCIAL

## 2018-06-21 DIAGNOSIS — D75.89 MACROCYTOSIS: ICD-10-CM

## 2018-06-21 DIAGNOSIS — R79.0 LOW MAGNESIUM LEVEL: ICD-10-CM

## 2018-06-21 DIAGNOSIS — Z13.9 ENCOUNTER FOR SCREENING, UNSPECIFIED: ICD-10-CM

## 2018-06-21 DIAGNOSIS — E55.9 VITAMIN D DEFICIENCY: ICD-10-CM

## 2018-06-21 DIAGNOSIS — R06.02 SHORTNESS OF BREATH: ICD-10-CM

## 2018-06-21 LAB
25(OH)D3+25(OH)D2 SERPL-MCNC: 25 NG/ML
CHOLEST SERPL-MCNC: 154 MG/DL
CHOLEST/HDLC SERPL: 2.4 {RATIO}
ERYTHROCYTE [DISTWIDTH] IN BLOOD BY AUTOMATED COUNT: 13.8 %
FERRITIN SERPL-MCNC: 136 NG/ML
HCT VFR BLD AUTO: 35.6 %
HDLC SERPL-MCNC: 64 MG/DL
HDLC SERPL: 41.6 %
HGB BLD-MCNC: 11.6 G/DL
IRON SERPL-MCNC: 62 UG/DL
LDLC SERPL CALC-MCNC: 61.4 MG/DL
MAGNESIUM SERPL-MCNC: 1.6 MG/DL
MCH RBC QN AUTO: 34.2 PG
MCHC RBC AUTO-ENTMCNC: 32.6 G/DL
MCV RBC AUTO: 105 FL
NONHDLC SERPL-MCNC: 90 MG/DL
PLATELET # BLD AUTO: 299 K/UL
PMV BLD AUTO: 9.7 FL
RBC # BLD AUTO: 3.39 M/UL
SATURATED IRON: 19 %
TOTAL IRON BINDING CAPACITY: 330 UG/DL
TRANSFERRIN SERPL-MCNC: 223 MG/DL
TRIGL SERPL-MCNC: 143 MG/DL
VIT B12 SERPL-MCNC: 171 PG/ML
WBC # BLD AUTO: 5.74 K/UL

## 2018-06-21 PROCEDURE — 85027 COMPLETE CBC AUTOMATED: CPT

## 2018-06-21 PROCEDURE — 83735 ASSAY OF MAGNESIUM: CPT

## 2018-06-21 PROCEDURE — 82607 VITAMIN B-12: CPT

## 2018-06-21 PROCEDURE — 36415 COLL VENOUS BLD VENIPUNCTURE: CPT

## 2018-06-21 PROCEDURE — 80061 LIPID PANEL: CPT

## 2018-06-21 PROCEDURE — 83540 ASSAY OF IRON: CPT

## 2018-06-21 PROCEDURE — 82728 ASSAY OF FERRITIN: CPT

## 2018-06-21 PROCEDURE — 82306 VITAMIN D 25 HYDROXY: CPT

## 2018-06-23 ENCOUNTER — PATIENT MESSAGE (OUTPATIENT)
Dept: INTERNAL MEDICINE | Facility: CLINIC | Age: 58
End: 2018-06-23

## 2018-06-23 PROBLEM — E53.8 VITAMIN B12 DEFICIENCY: Status: ACTIVE | Noted: 2018-06-23

## 2018-06-23 RX ORDER — LANOLIN ALCOHOL/MO/W.PET/CERES
1000 CREAM (GRAM) TOPICAL DAILY
COMMUNITY

## 2018-06-23 RX ORDER — CYANOCOBALAMIN 1000 UG/ML
1000 INJECTION, SOLUTION INTRAMUSCULAR; SUBCUTANEOUS ONCE
Status: DISCONTINUED | OUTPATIENT
Start: 2018-06-23 | End: 2019-01-24

## 2018-07-18 DIAGNOSIS — J44.9 CHRONIC OBSTRUCTIVE PULMONARY DISEASE, UNSPECIFIED COPD TYPE: ICD-10-CM

## 2018-07-19 RX ORDER — ALBUTEROL SULFATE 90 UG/1
AEROSOL, METERED RESPIRATORY (INHALATION)
Qty: 18 G | Refills: 1 | Status: SHIPPED | OUTPATIENT
Start: 2018-07-19 | End: 2018-11-25 | Stop reason: SDUPTHER

## 2018-09-19 RX ORDER — FLUTICASONE PROPIONATE AND SALMETEROL 500; 50 UG/1; UG/1
1 POWDER RESPIRATORY (INHALATION) 2 TIMES DAILY
Qty: 60 EACH | Refills: 11 | Status: SHIPPED | OUTPATIENT
Start: 2018-09-19 | End: 2019-09-19

## 2018-10-04 ENCOUNTER — PATIENT OUTREACH (OUTPATIENT)
Dept: ADMINISTRATIVE | Facility: HOSPITAL | Age: 58
End: 2018-10-04

## 2018-10-04 NOTE — PROGRESS NOTES
Pre-visit audit complete.  Pt due for cervical cancer screening.  Contacted pt and she stated she has not had a recent pap smear.  I encouraged her to schedule a GYN appointment, pt verbalized understanding.

## 2018-10-10 ENCOUNTER — OFFICE VISIT (OUTPATIENT)
Dept: INTERNAL MEDICINE | Facility: CLINIC | Age: 58
End: 2018-10-10
Payer: COMMERCIAL

## 2018-10-10 ENCOUNTER — PATIENT MESSAGE (OUTPATIENT)
Dept: ADMINISTRATIVE | Facility: OTHER | Age: 58
End: 2018-10-10

## 2018-10-10 VITALS
BODY MASS INDEX: 28.13 KG/M2 | HEIGHT: 63 IN | SYSTOLIC BLOOD PRESSURE: 96 MMHG | HEART RATE: 72 BPM | DIASTOLIC BLOOD PRESSURE: 70 MMHG | WEIGHT: 158.75 LBS

## 2018-10-10 DIAGNOSIS — E53.8 VITAMIN B12 DEFICIENCY: ICD-10-CM

## 2018-10-10 DIAGNOSIS — E55.9 VITAMIN D DEFICIENCY: ICD-10-CM

## 2018-10-10 DIAGNOSIS — I10 ESSENTIAL HYPERTENSION: ICD-10-CM

## 2018-10-10 DIAGNOSIS — J44.9 CHRONIC OBSTRUCTIVE PULMONARY DISEASE, UNSPECIFIED COPD TYPE: ICD-10-CM

## 2018-10-10 DIAGNOSIS — Z00.00 ANNUAL PHYSICAL EXAM: Primary | ICD-10-CM

## 2018-10-10 PROCEDURE — 99396 PREV VISIT EST AGE 40-64: CPT | Mod: S$GLB,,, | Performed by: INTERNAL MEDICINE

## 2018-10-10 PROCEDURE — 99999 PR PBB SHADOW E&M-EST. PATIENT-LVL III: CPT | Mod: PBBFAC,,, | Performed by: INTERNAL MEDICINE

## 2018-10-10 PROCEDURE — 3074F SYST BP LT 130 MM HG: CPT | Mod: CPTII,S$GLB,, | Performed by: INTERNAL MEDICINE

## 2018-10-10 PROCEDURE — 3078F DIAST BP <80 MM HG: CPT | Mod: CPTII,S$GLB,, | Performed by: INTERNAL MEDICINE

## 2018-10-10 RX ORDER — PREDNISONE 20 MG/1
40 TABLET ORAL DAILY
Qty: 14 TABLET | Refills: 0 | Status: SHIPPED | OUTPATIENT
Start: 2018-10-10 | End: 2018-10-17

## 2018-10-10 NOTE — PROGRESS NOTES
Subjective:       Patient ID: Diana Sellers is a 58 y.o. female.    Chief Complaint: Annual Exam    HPI    Last visit with me 04/2018. Since then seen by CT surgery following wedge resection, no further surgery follow up needed, return to Pulmonary for COPD management.     Still with some dyspnea on exertion. Going on vacation next week.     Reviewed PMH, PSH, SH, FH, allergies, and medications.     Review of Systems   Constitutional: Positive for activity change.   Eyes: Negative for discharge.   Respiratory: Positive for wheezing.    Cardiovascular: Negative for chest pain and palpitations.   Gastrointestinal: Negative for constipation, diarrhea and vomiting.   Genitourinary: Negative for difficulty urinating and hematuria.   Neurological: Negative for headaches.   Psychiatric/Behavioral: Negative for dysphoric mood.       Objective:      Physical Exam   Constitutional: She is oriented to person, place, and time. No distress.   HENT:   Head: Atraumatic.   Right Ear: Tympanic membrane normal. No tenderness.   Left Ear: Tympanic membrane normal. No tenderness.   Mouth/Throat: Oropharynx is clear and moist. No oropharyngeal exudate.   Eyes: Pupils are equal, round, and reactive to light. Right eye exhibits no discharge. Left eye exhibits no discharge.   Neck: Normal range of motion. No thyromegaly present.   Cardiovascular: Normal rate, regular rhythm and normal heart sounds.   Pulmonary/Chest: Effort normal and breath sounds normal. No stridor. She has no wheezes. She has no rales.   Musculoskeletal: She exhibits no edema (in bilateral ankles) or tenderness (in bilateral calf muscles).   Lymphadenopathy:     She has no cervical adenopathy.   Neurological: She is alert and oriented to person, place, and time.   Skin: Skin is warm and dry. No rash noted.   Psychiatric: She has a normal mood and affect. Her behavior is normal.   Nursing note and vitals reviewed.      Vitals:    10/10/18 0804 10/10/18 0827   BP:  "92/68 96/70   BP Location: Right arm Right arm   Patient Position: Sitting Sitting   BP Method: Small (Manual) Medium (Manual)   Pulse: 72    Weight: 72 kg (158 lb 11.7 oz)    Height: 5' 3" (1.6 m)      Body mass index is 28.12 kg/m².    RESULTS: Reviewed labs from last 6 months    Assessment:       1. Annual physical exam    2. Chronic obstructive pulmonary disease, unspecified COPD type    3. Essential hypertension    4. Vitamin B12 deficiency    5. Vitamin D deficiency        Plan:   Diana was seen today for annual exam.    Diagnoses and all orders for this visit:    Annual physical exam:  Age-appropriate health screening reviewed, indicated tests ordered. Pt will go for Pap smear sometime in next few mo.    Chronic obstructive pulmonary disease, unspecified COPD type:  Prior dx, currently stable but still with mild dyspnea on exertion despite maximal inhaler treatment. Going on vacation and will be doing more walking, use prednisone if shortness of breath or COPD exacerbation develops.  -     predniSONE (DELTASONE) 20 MG tablet; Take 2 tablets (40 mg total) by mouth once daily. for 7 days    Essential hypertension:  Prior dx, today blood pressure is low, [facility] should stop val[facility].  -     Basic metabolic panel; Future  -     CBC Without Differential; Future    Vitamin B12 deficiency:  Prior dx, has been taking oral supplement, recheck levels.  -     CBC Without Differential; Future  -     Vitamin B12; Future    Vitamin D deficiency  -     Vitamin D; Future    Other orders  -     [facility] ([facility]) Enrollment Order  -     Hypert[facility] ([facility]): Assign Onboarding Questionnaires    Follow-up in about 6 months (around 4/10/2019) for follow up visit.   Dinesh Lou MD  Internal Medicine    Portions of this note were completed using medical dictation software. Please excuse typographical or syntax errors that were missed on review.    "

## 2018-11-14 ENCOUNTER — TELEPHONE (OUTPATIENT)
Dept: INTERNAL MEDICINE | Facility: CLINIC | Age: 58
End: 2018-11-14

## 2018-11-14 DIAGNOSIS — J44.1 COPD EXACERBATION: Primary | ICD-10-CM

## 2018-11-14 RX ORDER — AZITHROMYCIN 250 MG/1
TABLET, FILM COATED ORAL
Qty: 6 TABLET | Refills: 0 | Status: SHIPPED | OUTPATIENT
Start: 2018-11-14 | End: 2019-01-10 | Stop reason: SDUPTHER

## 2018-11-14 RX ORDER — PREDNISONE 20 MG/1
40 TABLET ORAL DAILY
Qty: 10 TABLET | Refills: 0 | Status: SHIPPED | OUTPATIENT
Start: 2018-11-14 | End: 2018-11-19

## 2018-11-14 NOTE — TELEPHONE ENCOUNTER
Received call from phone  who stated that she could not get anyone to answer from [employer] On call. Pt has chronic Copd and dyspnea. She states that when she returned from a recent vacation she started with a cough 10/17/2018. She has not been able to shake it. Cough is productive(yellow/white). Denies fever or smoking. Positive for slight audible wheeze but states is able to cough it up. She admits to using left over Amoxil that she took for 5 day recently but she continues to cough. [id number] and [phone].

## 2018-11-14 NOTE — TELEPHONE ENCOUNTER
Pt with COPD exacerbation, start prednisone 2 tabs daily for 5 days, and azithromycin (Zpak) as prescribed. These have been sent to [facility]. If symptoms worsen please schedule urgent care visit.

## 2018-11-19 DIAGNOSIS — I10 ESSENTIAL HYPERTENSION: ICD-10-CM

## 2018-11-19 RX ORDER — VALSARTAN 160 MG/1
160 TABLET ORAL DAILY
Qty: 90 TABLET | Refills: 3 | Status: SHIPPED | OUTPATIENT
Start: 2018-11-19 | End: 2019-11-19

## 2018-11-20 DIAGNOSIS — G25.0 ESSENTIAL TREMOR: ICD-10-CM

## 2018-11-21 RX ORDER — PROPRANOLOL HYDROCHLORIDE 10 MG/1
10 TABLET ORAL EVERY 6 HOURS PRN
Qty: 90 TABLET | Refills: 11 | Status: SHIPPED | OUTPATIENT
Start: 2018-11-21 | End: 2019-11-21

## 2018-11-25 DIAGNOSIS — J44.9 CHRONIC OBSTRUCTIVE PULMONARY DISEASE, UNSPECIFIED COPD TYPE: ICD-10-CM

## 2018-11-26 RX ORDER — ALBUTEROL SULFATE 90 UG/1
AEROSOL, METERED RESPIRATORY (INHALATION)
Qty: 18 G | Refills: 5 | Status: SHIPPED | OUTPATIENT
Start: 2018-11-26

## 2018-12-21 ENCOUNTER — TELEPHONE (OUTPATIENT)
Dept: PULMONOLOGY | Facility: CLINIC | Age: 58
End: 2018-12-21

## 2018-12-21 NOTE — TELEPHONE ENCOUNTER
Spoke with Ms. Sellers and informed her that currently Dr. Lopes does not have any appointments available and that her 2019 schedule has not be completely open. Patient will be placed on the wait list.

## 2018-12-21 NOTE — TELEPHONE ENCOUNTER
----- Message from Mendez Chang sent at 12/21/2018  4:22 PM CST -----  Contact: Patient  Patient needs to make an appointment    Callback: ext [phone]

## 2018-12-28 ENCOUNTER — TELEPHONE (OUTPATIENT)
Dept: PULMONOLOGY | Facility: CLINIC | Age: 58
End: 2018-12-28

## 2018-12-28 DIAGNOSIS — R91.1 LUNG NODULE: Primary | ICD-10-CM

## 2019-01-10 ENCOUNTER — TELEPHONE (OUTPATIENT)
Dept: TRANSPLANT | Facility: CLINIC | Age: 59
End: 2019-01-10

## 2019-01-10 ENCOUNTER — HOSPITAL ENCOUNTER (OUTPATIENT)
Dept: RADIOLOGY | Facility: HOSPITAL | Age: 59
Discharge: HOME | End: 2019-01-10
Attending: INTERNAL MEDICINE
Payer: COMMERCIAL

## 2019-01-10 ENCOUNTER — OFFICE VISIT (OUTPATIENT)
Dept: PULMONOLOGY | Facility: CLINIC | Age: 59
End: 2019-01-10
Payer: COMMERCIAL

## 2019-01-10 VITALS
HEART RATE: 84 BPM | OXYGEN SATURATION: 93 % | SYSTOLIC BLOOD PRESSURE: 112 MMHG | WEIGHT: 163.13 LBS | HEIGHT: 63 IN | DIASTOLIC BLOOD PRESSURE: 78 MMHG | BODY MASS INDEX: 28.9 KG/M2

## 2019-01-10 DIAGNOSIS — J96.11 CHRONIC RESPIRATORY FAILURE WITH HYPOXIA: ICD-10-CM

## 2019-01-10 DIAGNOSIS — R91.1 LUNG NODULE: ICD-10-CM

## 2019-01-10 DIAGNOSIS — J44.9 CHRONIC OBSTRUCTIVE PULMONARY DISEASE, UNSPECIFIED COPD TYPE: Primary | ICD-10-CM

## 2019-01-10 DIAGNOSIS — J40 BRONCHITIS: Primary | ICD-10-CM

## 2019-01-10 DIAGNOSIS — J43.8 OTHER EMPHYSEMA: ICD-10-CM

## 2019-01-10 DIAGNOSIS — Z76.82 LUNG TRANSPLANT CANDIDATE: ICD-10-CM

## 2019-01-10 DIAGNOSIS — J84.10 LUNG GRANULOMA: ICD-10-CM

## 2019-01-10 PROBLEM — R91.8 MASS OF LEFT LUNG: Status: RESOLVED | Noted: 2017-11-30 | Resolved: 2019-01-10

## 2019-01-10 PROCEDURE — 3008F BODY MASS INDEX DOCD: CPT | Mod: CPTII,S$GLB,, | Performed by: INTERNAL MEDICINE

## 2019-01-10 PROCEDURE — 3078F DIAST BP <80 MM HG: CPT | Mod: CPTII,S$GLB,, | Performed by: INTERNAL MEDICINE

## 2019-01-10 PROCEDURE — 71250 CT THORAX DX C-: CPT | Mod: 26,,, | Performed by: RADIOLOGY

## 2019-01-10 PROCEDURE — 3008F PR BODY MASS INDEX (BMI) DOCUMENTED: ICD-10-PCS | Mod: CPTII,S$GLB,, | Performed by: INTERNAL MEDICINE

## 2019-01-10 PROCEDURE — 99999 PR PBB SHADOW E&M-EST. PATIENT-LVL III: CPT | Mod: PBBFAC,,, | Performed by: INTERNAL MEDICINE

## 2019-01-10 PROCEDURE — 3074F SYST BP LT 130 MM HG: CPT | Mod: CPTII,S$GLB,, | Performed by: INTERNAL MEDICINE

## 2019-01-10 PROCEDURE — 3074F PR MOST RECENT SYSTOLIC BLOOD PRESSURE < 130 MM HG: ICD-10-PCS | Mod: CPTII,S$GLB,, | Performed by: INTERNAL MEDICINE

## 2019-01-10 PROCEDURE — 99214 PR OFFICE/OUTPT VISIT, EST, LEVL IV, 30-39 MIN: ICD-10-PCS | Mod: S$GLB,,, | Performed by: INTERNAL MEDICINE

## 2019-01-10 PROCEDURE — 71250 CT THORAX DX C-: CPT | Mod: TC

## 2019-01-10 PROCEDURE — 99214 OFFICE O/P EST MOD 30 MIN: CPT | Mod: S$GLB,,, | Performed by: INTERNAL MEDICINE

## 2019-01-10 PROCEDURE — 99999 PR PBB SHADOW E&M-EST. PATIENT-LVL III: ICD-10-PCS | Mod: PBBFAC,,, | Performed by: INTERNAL MEDICINE

## 2019-01-10 PROCEDURE — 71250 CT CHEST WITHOUT CONTRAST: ICD-10-PCS | Mod: 26,,, | Performed by: RADIOLOGY

## 2019-01-10 PROCEDURE — 3078F PR MOST RECENT DIASTOLIC BLOOD PRESSURE < 80 MM HG: ICD-10-PCS | Mod: CPTII,S$GLB,, | Performed by: INTERNAL MEDICINE

## 2019-01-10 RX ORDER — AZITHROMYCIN 250 MG/1
TABLET, FILM COATED ORAL
Qty: 15 TABLET | Refills: 11 | Status: SHIPPED | OUTPATIENT
Start: 2019-01-10

## 2019-01-10 NOTE — PROGRESS NOTES
"Subjective:       Patient ID: Diana Sellers is a 58 y.o. female.    Chief Complaint: Pulmonary Nodules    58 year old with a history of severe COPD for which she states that she has worsening BAIN over the past year.  Has been treated approximately three times this year for an exacerbation.  Significantly improves with steroids.  Adherent to advair 500 BID, spiriva daily.  No respiratory symptoms.  No dysphagia or heartburn.       Review of Systems   Constitutional: Negative for weight loss and weight gain.   HENT: Negative for postnasal drip and trouble swallowing.    Respiratory: Negative for cough, hemoptysis and sputum production.    Cardiovascular: Negative for chest pain and leg swelling.       Objective:       Vitals:    01/10/19 1343   BP: 112/78   BP Location: Right arm   Patient Position: Sitting   Pulse: 84   SpO2: (!) 93%   Weight: 74 kg (163 lb 2.3 oz)   Height: 5' 3" (1.6 m)     Physical Exam   Constitutional: She is oriented to person, place, and time. She appears well-developed and well-nourished.   Cardiovascular: Normal rate and regular rhythm.   Pulmonary/Chest: She has no wheezes. She has no rales.   Musculoskeletal: Normal range of motion. She exhibits no edema.   Lymphadenopathy: No supraclavicular adenopathy is present.     She has no cervical adenopathy.   Neurological: She is alert and oriented to person, place, and time.   Psychiatric: She has a normal mood and affect.        Personal Diagnostic Review  CT of chest performed on today without contrast revealed no acute changes, lingular lesion has resolved.  No flowsheet data found.      Assessment:       1. Bronchitis    2. Other emphysema    3. Chronic respiratory failure with hypoxia    4. Lung granuloma        Outpatient Encounter Medications as of 1/10/2019   Medication Sig Dispense Refill    albuterol (VENTOLIN HFA) 90 mcg/actuation inhaler Inhale 2 puffs into the lungs every 6 hours as needed for wheezing. 18 g 5    " "albuterol-ipratropium (DUO-NEB) 2.5 mg-0.5 mg/3 mL nebulizer solution Take 1 VIAL (3 mLs) by nebulization every 6 (six) hours as needed. 270 mL 3    cyanocobalamin (VITAMIN B-12) 1000 MCG tablet Take 1,000 mcg by mouth once daily.      ergocalciferol (ERGOCALCIFEROL) 50,000 unit Cap Take 1 capsule (50,000 Units total) by mouth every 7 days. 12 capsule 3    fluticasone-salmeterol 500-50 mcg/dose (ADVAIR DISKUS) 500-50 mcg/dose DsDv diskus inhaler Inhale 1 puff into the lungs 2 (two) times daily. Controller 60 each 11    furosemide (LASIX) 40 MG tablet Take 1 tablet (40 mg total) by mouth daily as needed (edema). 30 tablet 11    magnesium oxide (MAG-OX) 250 mg Tab Take 250 mg by mouth daily as needed.      montelukast (SINGULAIR) 10 mg tablet Take 1 tablet (10 mg total) by mouth once daily. 90 tablet 3    pneumococcal 23-august ps vaccine (PNEUMOVAX 23) 25 mcg/0.5 mL Inject into the muscle. 0.5 mL 0    propranolol (INDERAL) 10 MG tablet Take 1 tablet (10 mg total) by mouth every 6 (six) hours as needed (tremors). 90 tablet 11    valsartan (DIOVAN) 160 MG tablet Take 1 tablet (160 mg total) by mouth once daily. 90 tablet 3    azithromycin (Z-IVORY) 250 MG tablet Every Monday, Wednesday and friday 15 tablet 11    tiotropium (SPIRIVA) 18 mcg inhalation capsule Inhale 1 capsule (18 mcg total) into the lungs once daily. 90 capsule 3    [DISCONTINUED] azithromycin (Z-IVORY) 250 MG tablet Take 2 tablets by mouth on day 1; Take 1 tablet by mouth on days 2-5 6 tablet 0     Facility-Administered Encounter Medications as of 1/10/2019   Medication Dose Route Frequency Provider Last Rate Last Dose    cyanocobalamin injection 1,000 mcg  1,000 mcg Intramuscular Once Dinesh Lou MD         Orders Placed This Encounter   Procedures    NEBULIZER FOR HOME USE     Order Specific Question:   Height:     Answer:   5' 3" (1.6 m)     Order Specific Question:   Weight:     Answer:   74 kg (163 lb 2.3 oz)     Order Specific Question: "   Length of need (1-99 months):     Answer:   99    Ambulatory consult to Transplant, Lung     Referral Priority:   Routine     Referral Type:   Transplants     Number of Visits Requested:   1    Spirometry without Bronchodilator     Standing Status:   Future     Standing Expiration Date:   1/10/2020    DLCO-Carbon Monoxide Diffusing Capacity     Standing Status:   Future     Standing Expiration Date:   1/10/2020    Stress test, pulmonary     Standing Status:   Future     Standing Expiration Date:   1/10/2020     Plan:     Problem List Items Addressed This Visit     Chronic obstructive pulmonary disease    Overview     Continue advair, spiriva and albuterol  Will start azithromycin M-W-Fri for immunomodulator effects.    Lung transplant evaluation.         Relevant Medications    azithromycin (Z-IVORY) 250 MG tablet    Other Relevant Orders    Spirometry without Bronchodilator    DLCO-Carbon Monoxide Diffusing Capacity    Stress test, pulmonary    Ambulatory consult to Transplant, Lung    NEBULIZER FOR HOME USE    Lung granuloma    Overview     Resolved.  Status post resection.         Chronic respiratory failure with hypoxia    Overview     Not interested in supplemental oxygen at this time.           Other Visit Diagnoses     Bronchitis    -  Primary    Relevant Orders    NEBULIZER FOR HOME USE

## 2019-01-10 NOTE — LETTER
01/15/2019    Liam Lopes  [address]  [place] [address]  Phone: [phone]  Fax: [phone]         Dear Dr. Lima Lopes    Patient: Diana Sellers     MR Number: [id number]     YOB: 1960       Thank you for the referral of Diana Sellers to our lung transplant program. An initial appointment with the transplant team has been scheduled for January 24, 2019. You will receive an after-visit summary following the completion of your patients appointment in our clinic.    Thank you again for your trust in our program. If there is anything we can do for you or your staff, please feel free to contact us at [phone].    Sincerely,         Tristen Sifuentes MD   Director, Lung Transplantation   Pulmonary & Critical Care Medicine    [facility]  [address] [place] [id number]  [phone]

## 2019-01-11 ENCOUNTER — TELEPHONE (OUTPATIENT)
Dept: PULMONOLOGY | Facility: CLINIC | Age: 59
End: 2019-01-11

## 2019-01-11 NOTE — TELEPHONE ENCOUNTER
Reviewed referral with Dr. Sifuentes.  He would like to consult with urine tox and adolfo screens, PFT's, 6MWT, and 2D Echo w/CFD.

## 2019-01-14 NOTE — TELEPHONE ENCOUNTER
Contacted patient regarding scheduling consult appointment.  Offered 1/24.  She accepts.  Scheduling card given to .

## 2019-01-24 ENCOUNTER — HOSPITAL ENCOUNTER (OUTPATIENT)
Dept: CARDIOLOGY | Facility: CLINIC | Age: 59
Discharge: HOME | End: 2019-01-24
Attending: INTERNAL MEDICINE
Payer: COMMERCIAL

## 2019-01-24 ENCOUNTER — HOSPITAL ENCOUNTER (OUTPATIENT)
Dept: PULMONOLOGY | Facility: CLINIC | Age: 59
Discharge: HOME | End: 2019-01-24
Payer: COMMERCIAL

## 2019-01-24 ENCOUNTER — INITIAL CONSULT (OUTPATIENT)
Dept: TRANSPLANT | Facility: CLINIC | Age: 59
End: 2019-01-24
Payer: COMMERCIAL

## 2019-01-24 ENCOUNTER — LAB VISIT (OUTPATIENT)
Dept: LAB | Facility: HOSPITAL | Age: 59
End: 2019-01-24
Attending: INTERNAL MEDICINE
Payer: COMMERCIAL

## 2019-01-24 VITALS
SYSTOLIC BLOOD PRESSURE: 122 MMHG | HEART RATE: 104 BPM | HEIGHT: 63 IN | WEIGHT: 164 LBS | BODY MASS INDEX: 29.06 KG/M2 | DIASTOLIC BLOOD PRESSURE: 80 MMHG

## 2019-01-24 VITALS — WEIGHT: 159.38 LBS | HEIGHT: 61 IN | BODY MASS INDEX: 30.09 KG/M2

## 2019-01-24 VITALS
TEMPERATURE: 98 F | HEART RATE: 103 BPM | OXYGEN SATURATION: 96 % | SYSTOLIC BLOOD PRESSURE: 135 MMHG | BODY MASS INDEX: 30.02 KG/M2 | DIASTOLIC BLOOD PRESSURE: 77 MMHG | RESPIRATION RATE: 20 BRPM | HEIGHT: 61 IN | WEIGHT: 159 LBS

## 2019-01-24 DIAGNOSIS — Z76.82 LUNG TRANSPLANT CANDIDATE: ICD-10-CM

## 2019-01-24 DIAGNOSIS — J44.9 CHRONIC OBSTRUCTIVE PULMONARY DISEASE, UNSPECIFIED COPD TYPE: ICD-10-CM

## 2019-01-24 DIAGNOSIS — J96.11 CHRONIC RESPIRATORY FAILURE WITH HYPOXIA: ICD-10-CM

## 2019-01-24 DIAGNOSIS — I27.20 PULMONARY HYPERTENSION: ICD-10-CM

## 2019-01-24 DIAGNOSIS — J44.9 CHRONIC OBSTRUCTIVE PULMONARY DISEASE, UNSPECIFIED COPD TYPE: Primary | ICD-10-CM

## 2019-01-24 LAB
AMPHET+METHAMPHET UR QL: NEGATIVE
ASCENDING AORTA: 3.23 CM
AV INDEX (PROSTH): 0.89
AV MEAN GRADIENT: 4.63 MMHG
AV PEAK GRADIENT: 8.76 MMHG
AV VALVE AREA: 2.8 CM2
AV VELOCITY RATIO: 0.77
BARBITURATES UR QL SCN>200 NG/ML: NEGATIVE
BENZODIAZ UR QL SCN>200 NG/ML: NEGATIVE
BSA FOR ECHO PROCEDURE: 1.82 M2
BZE UR QL SCN: NEGATIVE
CANNABINOIDS UR QL SCN: NEGATIVE
CREAT UR-MCNC: 130 MG/DL
CV ECHO LV RWT: 0.36 CM
DOP CALC AO PEAK VEL: 1.48 M/S
DOP CALC AO VTI: 25.9 CM
DOP CALC LVOT AREA: 3.14 CM2
DOP CALC LVOT DIAMETER: 2 CM
DOP CALC LVOT PEAK VEL: 1.14 M/S
DOP CALC LVOT STROKE VOLUME: 72.53 CM3
DOP CALCLVOT PEAK VEL VTI: 23.1 CM
E WAVE DECELERATION TIME: 186.03 MSEC
E/A RATIO: 0.68
E/E' RATIO: 9.85
ECHO LV POSTERIOR WALL: 0.79 CM (ref 0.6–1.1)
ETHANOL UR-MCNC: 20 MG/DL
FRACTIONAL SHORTENING: 30 % (ref 28–44)
INTERVENTRICULAR SEPTUM: 0.86 CM (ref 0.6–1.1)
LA MAJOR: 4.96 CM
LA MINOR: 5.12 CM
LA WIDTH: 2.82 CM
LEFT ATRIUM SIZE: 3.43 CM
LEFT ATRIUM VOLUME INDEX: 23.3 ML/M2
LEFT ATRIUM VOLUME: 41.43 CM3
LEFT INTERNAL DIMENSION IN SYSTOLE: 3.09 CM (ref 2.1–4)
LEFT VENTRICLE DIASTOLIC VOLUME INDEX: 49.57 ML/M2
LEFT VENTRICLE DIASTOLIC VOLUME: 88.1 ML
LEFT VENTRICLE MASS INDEX: 64.4 G/M2
LEFT VENTRICLE SYSTOLIC VOLUME INDEX: 21.2 ML/M2
LEFT VENTRICLE SYSTOLIC VOLUME: 37.72 ML
LEFT VENTRICULAR INTERNAL DIMENSION IN DIASTOLE: 4.41 CM (ref 3.5–6)
LEFT VENTRICULAR MASS: 114.4 G
LV LATERAL E/E' RATIO: 9.14
LV SEPTAL E/E' RATIO: 10.67
METHADONE UR QL SCN>300 NG/ML: NEGATIVE
MV PEAK A VEL: 0.94 M/S
MV PEAK E VEL: 0.64 M/S
OPIATES UR QL SCN: NEGATIVE
PCP UR QL SCN>25 NG/ML: NEGATIVE
PISA TR MAX VEL: 3.82 M/S
PRE FEV1 FVC: 44
PRE FEV1: 0.77
PRE FVC: 1.75
PREDICTED FEV1 FVC: 82
PREDICTED FEV1: 2.24
PREDICTED FVC: 2.75
RA MAJOR: 4.33 CM
RA PRESSURE: 3 MMHG
RA WIDTH: 1.74 CM
RIGHT VENTRICULAR END-DIASTOLIC DIMENSION: 2.67 CM
SINUS: 2.85 CM
STJ: 1.63 CM
TDI LATERAL: 0.07
TDI SEPTAL: 0.06
TDI: 0.07
TOXICOLOGY INFORMATION: ABNORMAL
TR MAX PG: 58.37 MMHG
TRICUSPID ANNULAR PLANE SYSTOLIC EXCURSION: 1.92 CM
TV REST PULMONARY ARTERY PRESSURE: 61 MMHG

## 2019-01-24 PROCEDURE — 94618 PULMONARY STRESS TESTING: CPT | Mod: S$GLB,,, | Performed by: INTERNAL MEDICINE

## 2019-01-24 PROCEDURE — 99999 PR PBB SHADOW E&M-EST. PATIENT-LVL IV: CPT | Mod: PBBFAC,TXP,, | Performed by: INTERNAL MEDICINE

## 2019-01-24 PROCEDURE — 3008F PR BODY MASS INDEX (BMI) DOCUMENTED: ICD-10-PCS | Mod: CPTII,S$GLB,, | Performed by: INTERNAL MEDICINE

## 2019-01-24 PROCEDURE — 94729 DIFFUSING CAPACITY: CPT | Mod: S$GLB,,, | Performed by: INTERNAL MEDICINE

## 2019-01-24 PROCEDURE — 93306 TRANSTHORACIC ECHO (TTE) COMPLETE (CUPID ONLY): ICD-10-PCS | Mod: S$GLB,,, | Performed by: INTERNAL MEDICINE

## 2019-01-24 PROCEDURE — 99999 PR PBB SHADOW E&M-EST. PATIENT-LVL IV: ICD-10-PCS | Mod: PBBFAC,TXP,, | Performed by: INTERNAL MEDICINE

## 2019-01-24 PROCEDURE — 80307 DRUG TEST PRSMV CHEM ANLYZR: CPT | Mod: TXP

## 2019-01-24 PROCEDURE — 3078F PR MOST RECENT DIASTOLIC BLOOD PRESSURE < 80 MM HG: ICD-10-PCS | Mod: CPTII,S$GLB,, | Performed by: INTERNAL MEDICINE

## 2019-01-24 PROCEDURE — 94727 PR PULM FUNCTION TEST BY GAS: ICD-10-PCS | Mod: S$GLB,,, | Performed by: INTERNAL MEDICINE

## 2019-01-24 PROCEDURE — 94729 PR C02/MEMBANE DIFFUSE CAPACITY: ICD-10-PCS | Mod: S$GLB,,, | Performed by: INTERNAL MEDICINE

## 2019-01-24 PROCEDURE — 3075F SYST BP GE 130 - 139MM HG: CPT | Mod: CPTII,S$GLB,, | Performed by: INTERNAL MEDICINE

## 2019-01-24 PROCEDURE — 94727 GAS DIL/WSHOT DETER LNG VOL: CPT | Mod: S$GLB,,, | Performed by: INTERNAL MEDICINE

## 2019-01-24 PROCEDURE — 99215 PR OFFICE/OUTPT VISIT, EST, LEVL V, 40-54 MIN: ICD-10-PCS | Mod: 25,S$GLB,, | Performed by: INTERNAL MEDICINE

## 2019-01-24 PROCEDURE — 94010 BREATHING CAPACITY TEST: ICD-10-PCS | Mod: S$GLB,,, | Performed by: INTERNAL MEDICINE

## 2019-01-24 PROCEDURE — 93306 TTE W/DOPPLER COMPLETE: CPT | Mod: S$GLB,,, | Performed by: INTERNAL MEDICINE

## 2019-01-24 PROCEDURE — 3075F PR MOST RECENT SYSTOLIC BLOOD PRESS GE 130-139MM HG: ICD-10-PCS | Mod: CPTII,S$GLB,, | Performed by: INTERNAL MEDICINE

## 2019-01-24 PROCEDURE — 3008F BODY MASS INDEX DOCD: CPT | Mod: CPTII,S$GLB,, | Performed by: INTERNAL MEDICINE

## 2019-01-24 PROCEDURE — 99215 OFFICE O/P EST HI 40 MIN: CPT | Mod: 25,S$GLB,, | Performed by: INTERNAL MEDICINE

## 2019-01-24 PROCEDURE — 94010 BREATHING CAPACITY TEST: CPT | Mod: S$GLB,,, | Performed by: INTERNAL MEDICINE

## 2019-01-24 PROCEDURE — 80323 ALKALOIDS NOS: CPT | Mod: TXP

## 2019-01-24 PROCEDURE — 3078F DIAST BP <80 MM HG: CPT | Mod: CPTII,S$GLB,, | Performed by: INTERNAL MEDICINE

## 2019-01-24 PROCEDURE — 94618 PULMONARY STRESS TESTING: ICD-10-PCS | Mod: S$GLB,,, | Performed by: INTERNAL MEDICINE

## 2019-01-24 NOTE — LETTER
January 24, 2019        Lima Lopes  [address]  [facility] [address]  Phone: [phone]  Fax: [phone]             [facility] - [facility] Transplant  [address]  [facility] [address]  Phone: [phone]   Patient: Diana Sellers   MR Number: [id number]   YOB: 1960   Date of Visit: 1/24/2019       Dear Dr. Lima Lopes    Thank you for referring Diana Sellers to me for evaluation. Attached you will find relevant portions of my assessment and plan of care.    If you have questions, please do not hesitate to call me. I look forward to following Diana Sellers along with you.    Sincerely,    Isabel Calderon, DO    Enclosure    If you would like to receive this communication electronically, please contact [email or web address] or [phone] to request [employer] access.    [employer] is a tool which provides read-only access to select patient information with whom you have a relationship. Its easy to use and provides real time access to review your patients record including encounter summaries, notes, results, and demographic information.    If you feel you have received this communication in error or would no longer like to receive these types of communications, please e-mail [email or web address]

## 2019-01-24 NOTE — PROGRESS NOTES
LUNG TRANSPLANT INITIAL EVALUATION                                                                                                                                           Reason for Visit:  Evaluation for lung transplant    Referring Physician: Lima Lopes MD    History of Present Illness: Diana Sellers is a 58 y.o. female who is on 0L of oxygen.  She is on no assisted ventilation.  Her New York Heart Association Class is II and a Karnofsky score of 80% - Normal activity with effort: some symptoms of disease. She is not diabetic. She presents today for evaluation for lung transplant. She carries a diagnosis of COPD.     Patient states she first became symptomatic in the early 2000s. She reports a 35 ppd smoking history and quit in January 2014. She also has a history of HTN and anxiety. She states her symptoms began as a productive cough with occasional white sputum. She is unclear of the timeline of events; however, she states she was diagnosed with COPD by her original pulmonologist at [facility] (Dr. Agudelo) based upon her PFTs and CXR in the early 2000s. She had developed a pneumonia prior to her diagnosis which prompted chest imaging. She was started on an albuterol inhaler at that time and initially reported improvement in her cough. In 2004, patient was started on Spiriva and Advair per her PCP, Dr. Isaias Bell. Patient then noted progression of her symptoms in 2011, stating she lived on the [place] in a large home and was no longer able to maintain it. In 2012, she moved to [place] and began working at [employer] when she had an exacerbation that required a breathing treatment in the ED with subsequent discharge home the same day. She states over a ten year period, she only had 3 exacerbations requiring outpatient antibiotics and steroids.     In 2017, she was noted to have QUOC nodule and underwent CT chest 9/5/17, which revealed a 2.2 x 1.3 cm soft tissue nodule of QUOC at medial aspect of  anterior segment which was not present in previous imaging in 2015. She subsequently underwent VATS with wedge resection of QUOC per Dr. Ramirez in October 2017. Pathology was benign and she has been monitored by her PCP and thoracic surgery with repeat CTs. She was most recently evaluated for COPD management to Dr. Lopes earlier this month and was started on Azithromycin MWF for immunomodulator effects in addition to her current therapy.     Patient states that over the last 3 years, she has had at least 3-4 COPD exacerbations a year, but has never required hospitalization nor has she ever been placed on BiPAP or required intubation. She has been treated with antibiotics and steroids during each exacerbation with return to her respiratory baseline. She reports progressive shortness of breath, specifically over the last 6 months, stating she can no longer perform her regular ADL as well as she used to. She now avoids any stairs and requires recovery just walking down the hallways at work. 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 She reports humidity and cold weather triggers.    Over the past week, patient reports progressive lower extremity edema and states she sleeps in a recliner at night. She reports taking her prn lasix on a daily basis for her symptoms. She reports intermittent, dry cough, and dyspnea with minimal exertion. She denies sputum production, fevers, chills, hemoptysis, chest pain, headaches, wheezing, congestion, post-nasal drip, lightheadedness, dizziness, abdominal pain, n/v/d/c, recent sick contacts, recent hospitalizations. She currently lives alone in her apartment, but her son lives in [place] and daughter in [place], [place].       Past Medical History:   Diagnosis Date    Anxiety     COPD (chronic obstructive pulmonary disease)     Fractures 2007    History rib fractures with coughing, last in 2007.    History of  vitamin D deficiency     mild in past, treated with 50k units Vit D3 weekly, no longer on treatment    Hypertension        Past Surgical History:   Procedure Laterality Date    [id number] N/A 9/20/2017    Performed by [facility] Provider at [facility] OR 2ND FLR    BONE BIOPSY Right     shoulder - indeterminate (did 2 of the 3 follow ups)    COLONOSCOPY  late 90s?    [facility]2003 or [facility]    COLONOSCOPY N/A 5/18/2015    Performed by Morris Ortiz Jr., MD at [facility] ENDO    DILATION AND CURETTAGE OF UTERUS  1980    THORACOSCOPY-VIDEO ASSISTED (VATS) W/WEDGE LUNG RESECTION Left 11/30/2017    Performed by Dinesh Ramirez MD at [facility] OR 2ND FLR    WISDOM TOOTH EXTRACTION      1981       Allergies: Ace inhibitors    Current Outpatient Medications   Medication Sig    albuterol (VENTOLIN HFA) 90 mcg/actuation inhaler Inhale 2 puffs into the lungs every 6 hours as needed for wheezing.    albuterol-ipratropium (DUO-NEB) 2.5 mg-0.5 mg/3 mL nebulizer solution Take 1 VIAL (3 mLs) by nebulization every 6 (six) hours as needed.    azithromycin (Z-IVORY) 250 MG tablet Take 1 tablet by mouth every Monday, Wednesday and Friday    cyanocobalamin (VITAMIN B-12) 1000 MCG tablet Take 1,000 mcg by mouth once daily.    ergocalciferol (ERGOCALCIFEROL) 50,000 unit Cap Take 1 capsule (50,000 Units total) by mouth every 7 days.    fluticasone-salmeterol 500-50 mcg/dose (ADVAIR DISKUS) 500-50 mcg/dose DsDv diskus inhaler Inhale 1 puff into the lungs 2 (two) times daily. Controller    furosemide (LASIX) 40 MG tablet Take 1 tablet (40 mg total) by mouth daily as needed (edema).    montelukast (SINGULAIR) 10 mg tablet Take 1 tablet (10 mg total) by mouth once daily.    propranolol (INDERAL) 10 MG tablet Take 1 tablet (10 mg total) by mouth every 6 (six) hours as needed (tremors).    tiotropium (SPIRIVA) 18 mcg inhalation capsule Inhale 1 capsule (18 mcg total) into the lungs once daily.     valsartan (DIOVAN) 160 MG tablet Take 1 tablet (160 mg total) by mouth once daily.    pneumococcal 23-august ps vaccine (PNEUMOVAX 23) 25 mcg/0.5 mL Inject into the muscle.     No current facility-administered medications for this visit.        Immunization History   Administered Date(s) Administered    Influenza 10/05/2018    Influenza - Quadrivalent - PF 10/06/2016, 2017    Tdap 2012, 2017     Family History:    Family History   Problem Relation Age of Onset    Hypertension Mother     Diabetes Mother     Colon cancer Father     Hypertension Father     Retinal detachment Father     COPD Father         hx of MAC infection    Spina bifida Brother     Amblyopia Neg Hx     Blindness Neg Hx     Cataracts Neg Hx     Glaucoma Neg Hx     Macular degeneration Neg Hx     Strabismus Neg Hx     Anesthesia problems Neg Hx      Social History     Substance and Sexual Activity   Alcohol Use Yes    Alcohol/week: 1.8 oz    Types: 3 Glasses of wine per week    Comment: 3 glass wine/day      Social History     Substance and Sexual Activity   Drug Use No      Social History     Socioeconomic History    Marital status:      Spouse name:     Number of children: 2    Years of education: Not on file    Highest education level: Not on file   Social Needs    Financial resource strain: Not on file    Food insecurity - worry: Not on file    Food insecurity - inability: Not on file    Transportation needs - medical: Not on file    Transportation needs - non-medical: Not on file   Occupational History    Occupation: RN Case Management     Employer: [employer]   Tobacco Use    Smoking status: Former Smoker     Packs/day: 1.00     Years: 35.00     Pack years: 35.00     Types: Cigarettes     Last attempt to quit: 2014     Years since quittin.0    Smokeless tobacco: Never Used    Tobacco comment: she quit!----COPD   Substance and Sexual Activity    Alcohol  "use: Yes     Alcohol/week: 1.8 oz     Types: 3 Glasses of wine per week     Comment: 3 glass wine/day    Drug use: No    Sexual activity: Not Currently     Birth control/protection: Post-menopausal     Comment: menopause 10 years ago, 2 children, 1 miscairrage   Other Topics Concern    Not on file   Social History Narrative    Not on file     Review of Systems   Constitutional: Negative for chills, diaphoresis, fever, malaise/fatigue and weight loss.   HENT: Negative for congestion, ear discharge, ear pain, hearing loss, nosebleeds, sinus pain, sore throat and tinnitus.    Eyes: Negative for blurred vision, double vision, photophobia, pain, discharge and redness.   Respiratory: Positive for cough (dry) and shortness of breath (with exertion only). Negative for hemoptysis, sputum production, wheezing and stridor.    Cardiovascular: Negative for chest pain, palpitations, orthopnea, claudication, leg swelling and PND.   Gastrointestinal: Negative for abdominal pain, blood in stool, constipation, diarrhea, heartburn, melena, nausea and vomiting.   Genitourinary: Negative for dysuria, flank pain, frequency, hematuria and urgency.   Musculoskeletal: Negative for back pain, falls, joint pain, myalgias and neck pain.   Skin: Negative for itching and rash.   Neurological: Negative for dizziness, tingling, tremors, sensory change, speech change, focal weakness, seizures, loss of consciousness, weakness and headaches.   Endo/Heme/Allergies: Negative for environmental allergies and polydipsia. Does not bruise/bleed easily.   Psychiatric/Behavioral: Negative for depression, hallucinations, memory loss, substance abuse and suicidal ideas. The patient is not nervous/anxious and does not have insomnia.      Vitals  /77   Pulse 103   Temp 98.4 °F (36.9 °C) (Oral)   Resp 20   Ht 5' 1" (1.549 m)   Wt 72.1 kg (159 lb)   SpO2 96% Comment: room air  BMI 30.04 kg/m²   Physical Exam   Constitutional: She is oriented to " person, place, and time and well-developed, well-nourished, and in no distress. No distress.   HENT:   Head: Normocephalic and atraumatic.   Nose: Nose normal.   Eyes: Conjunctivae and EOM are normal. No scleral icterus.   Neck: Normal range of motion. Neck supple. No JVD present. No tracheal deviation present.   Cardiovascular: Normal rate, regular rhythm, normal heart sounds and intact distal pulses. Exam reveals no gallop and no friction rub.   No murmur heard.  Pulmonary/Chest: Effort normal. No stridor. No respiratory distress. She has decreased breath sounds (minimally). She has no wheezes. She has no rales. She exhibits no tenderness.   Abdominal: Soft. Bowel sounds are normal. She exhibits no distension. There is no tenderness.   Musculoskeletal: Normal range of motion. She exhibits edema (trace lower extremity). She exhibits no tenderness or deformity.   Neurological: She is alert and oriented to person, place, and time. Gait normal.   Skin: Skin is warm and dry. No rash noted. She is not diaphoretic. No erythema. No pallor.   Psychiatric: Mood, memory, affect and judgment normal.   Nursing note and vitals reviewed.      Labs:  Hospital Outpatient Visit on 01/24/2019   Component Date Value    Ascending aorta 01/24/2019 3.23     STJ 01/24/2019 1.63     AV mean gradient 01/24/2019 4.63     Ao peak junior 01/24/2019 1.48     Ao VTI 01/24/2019 25.90     IVS 01/24/2019 0.86     LA size 01/24/2019 3.43     Left Atrium Major Axis 01/24/2019 4.96     Left Atrium Minor Axis 01/24/2019 5.12     LVIDD 01/24/2019 4.41     LVIDS 01/24/2019 3.09     LVOT diameter 01/24/2019 2.00     LVOT peak VTI 01/24/2019 23.10     PW 01/24/2019 0.79     MV Peak A Junior 01/24/2019 0.94     E wave decelartion time 01/24/2019 186.03     MV Peak E Junior 01/24/2019 0.64     RA Major Axis 01/24/2019 4.33     RA Width 01/24/2019 1.74     RVDD 01/24/2019 2.67     Sinus 01/24/2019 2.85     TAPSE 01/24/2019 1.92     TR Max Junior  01/24/2019 3.82     TDI LATERAL 01/24/2019 0.07     TDI SEPTAL 01/24/2019 0.06     LA WIDTH 01/24/2019 2.82     LV Diastolic Volume 01/24/2019 88.10     LV Systolic Volume 01/24/2019 37.72     LVOT peak katja 01/24/2019 [id number]     LV LATERAL E/E' RATIO 01/24/2019 9.14     LV SEPTAL E/E' RATIO 01/24/2019 10.67     FS 01/24/2019 30     LA volume 01/24/2019 41.43     LV mass 01/24/2019 114.40     Left Ventricle Relative * 01/24/2019 0.36     AV valve area 01/24/2019 2.80     AV Velocity Ratio 01/24/2019 0.77     AV index (prosthetic) 01/24/2019 0.89     E/A ratio 01/24/2019 0.68     Mean e' 01/24/2019 0.07     LVOT area 01/24/2019 3.14     LVOT stroke volume 01/24/2019 72.53     AV peak gradient 01/24/2019 8.76     E/E' ratio 01/24/2019 9.85     Triscuspid Valve Regurgi* 01/24/2019 58.37     BSA 01/24/2019 1.82     LV Systolic Volume Index 01/24/2019 21.2     LV Diastolic Volume Index 01/24/2019 49.57     LA Volume Index 01/24/2019 23.3     LV Mass Index 01/24/2019 64.4     Right Atrial Pressure (f* 01/24/2019 3     TV rest pulmonary artery* 01/24/2019 61        Pulmonary Function Tests 1/24/2019   FVC 1.75   FEV1 0.77   TLC (liters) 4.5   DLCO (ml/mmHg sec) 9.2   FVC% 64   FEV1% 34   FEF 25-75 0.3   FEF 25-75% 13   TLC% 104   RV 2.47   RV% 157   DLCO% 50     6MW 1/24/2019 1/18/2017   6MWT Status completed with stops completed with stops   Patient Reported Dyspnea Dyspnea   Was O2 used? No No   6MW Distance walked (feet) 1000 1400   Distance walked (meters) 304.8 426.72   Did patient stop? Yes Yes   Oxygen Saturation 95 98   Supplemental Oxygen Room Air Room Air   Heart Rate 105 86   Blood Pressure 147/81 113/75   Erich Dyspnea Rating  light light   Oxygen Saturation 78 85   Supplemental Oxygen Room Air Room Air   Heart Rate 140 130   Blood Pressure 186/92 140/79   Erich Dyspnea Rating  very heavy very heavy   Recovery Time (seconds) 210 117   Oxygen Saturation 95 95   Supplemental  Oxygen Room Air Room Air   Heart Rate 109 98       Imaging:  Results for orders placed during the hospital encounter of 18   X-Ray Chest PA And Lateral    Narrative EXAMINATION:  XR CHEST PA AND LATERAL    CLINICAL HISTORY:  dyspnea on exertion; Cough    TECHNIQUE:  PA and lateral views of the chest were performed.    COMPARISON:  2017    FINDINGS:  The trachea is unremarkable.  There are calcifications of the aortic knob.  The cardiomediastinal silhouette is within normal limits.  The hemidiaphragms are unremarkable.  There is no evidence of free air beneath the hemidiaphragms.  No pleural effusions are present.  There is no evidence of a pneumothorax.  There is no evidence of pneumomediastinum.  No airspace opacities are present.  The osseous structures are unremarkable.      Impression No acute process.      Electronically signed by: Quinn Bernstein MD  Date:    2018  Time:    17:27     Results for orders placed in visit on 12   DXA Bone Density Spine And Hip    Narrative : 1960 ORDERING PHYSICIAN: AMITA Gatica LOCATION: [facility] [place]    HISTORY: 51y/o female with no hx of fractures. She had menopausal symptoms at 40 y/o. She has Chronic Obstructive L. D . She smokes between 10 and 20 cigarettes a day for over 35 years.    TECHNIQUE: Bone Mineral Density performed using [employer] [place] (S/N [id number]) reveals good positioning of lumbar spine and hip.    BONE MINERAL DENSITY RESULTS:  Lumbar Spine: Lumbar bone mineral density L1-L4 is 1.037g/cm2, which is a t-score of -0.1. The z-score is 0.7.    Total Hip: The total hip bone mineral density is 0.807g/cm2.  The t-score is -1.1, and the z-score is -0.6.  Femoral neck BMD is 0.756g/cm2 and the t-score is -0.8.      COMPARISONS: No Previous studies available.    Impression  Low bone mass/osteopenia of the total hip. FRAX calculations do not suggest treatment.          RECOMMENDATIONS:  1. Adequate Calcium and Vitamin D.  2. Stop smoking.  3.  Repeat BMD in 2 years.    EXPLANATION OF RESULTS:  The t-score compares this results to the bone density of a 25 year old of the same gender. The z-score compares this result to the average bone density to people of the same age and gender. The amounts indicate the number of standard deviations above or   below the mean.  * Osteoporosis is generally defined as having a t-score between less than -2.5.  * Osteopenia is generally defined as having a t-score between -1 and -2.5.  * The normal range is generally defined as having a t-score between -1 to 1.  ______________________________________     Electronically signed by: LILIANE FERNANDES MD  Date:     07/31/12  Time:    13:05      .card      Assessment:  1. Chronic obstructive pulmonary disease, unspecified COPD type    2. Chronic respiratory failure with hypoxia    3. Pulmonary hypertension    4. Lung transplant candidate      Plan:     1. PFTs consistent with obstructive disease. CT chest with centrilobar emphysematous changes. Would continue her current COPD regimen with Spiriva, Singulair, Ventolin, and Azithromycin.     2. Discussed how patient would benefit from oxygen supplementation at this time; however, patient became very tearful and fears it will interfere with her work. Would ideally like to optimize her COPD and PH management prior to qualifying her for oxygen. Would likely benefit from pulmonary rehab.     3. WHO group III PH. Patient reports increasing lower extremity edema and has been taking her lasix daily for the last week. Will refer patient to Heart Transplant clinic for PH managament.     4. With regards to transplant candidacy, patient meets disease specific criteria for and would clearly benefit from lung transplant at this time. Her ROBERT today is 6, which predicts a 57% 4 year survival without transplant; however, her more frequent exacerbations over the last few years is concerning for progression of her disease. She walked 1000 feet  during her 6MWT and her most recent TTE was significant for PAP of 61. Would like to optimize her PH management prior to initiating workup, but will continue to monitor in the outpatient clinic.     5. RTC in 3 months or sooner if needed.       Funmilayo Loo PA-C

## 2019-01-25 ENCOUNTER — PATIENT MESSAGE (OUTPATIENT)
Dept: TRANSPLANT | Facility: CLINIC | Age: 59
End: 2019-01-25

## 2019-01-25 ENCOUNTER — TELEPHONE (OUTPATIENT)
Dept: TRANSPLANT | Facility: CLINIC | Age: 59
End: 2019-01-25

## 2019-01-25 DIAGNOSIS — Z79.899 POLYPHARMACY: Primary | ICD-10-CM

## 2019-01-25 DIAGNOSIS — R06.82 TACHYPNEA: ICD-10-CM

## 2019-01-25 NOTE — TELEPHONE ENCOUNTER
----- Message from Lian Yoon sent at 1/25/2019 10:54 AM CST -----  Contact: Mandeep ext# [phone] with lung transplant  Pt has been referred to [facility] by Funmilayo Loo and their office would like the pt to be called to schdule her appt. After scheduling the appt Mandeep would like a call letting them know the appt has been scheduled.    Thanks

## 2019-01-27 LAB
ANABASINE UR-MCNC: <2 NG/ML
COTININE UR-MCNC: <5 NG/ML
NICOTINE UR-MCNC: <5 NG/ML
NORNICOTINE UR-MCNC: <2 NG/ML

## 2019-01-28 ENCOUNTER — PATIENT MESSAGE (OUTPATIENT)
Dept: TRANSPLANT | Facility: CLINIC | Age: 59
End: 2019-01-28

## 2019-01-28 ENCOUNTER — LAB VISIT (OUTPATIENT)
Dept: LAB | Facility: HOSPITAL | Age: 59
End: 2019-01-28
Attending: INTERNAL MEDICINE
Payer: COMMERCIAL

## 2019-01-28 ENCOUNTER — OFFICE VISIT (OUTPATIENT)
Dept: TRANSPLANT | Facility: CLINIC | Age: 59
End: 2019-01-28
Payer: COMMERCIAL

## 2019-01-28 VITALS
HEIGHT: 61 IN | WEIGHT: 162.06 LBS | SYSTOLIC BLOOD PRESSURE: 139 MMHG | DIASTOLIC BLOOD PRESSURE: 80 MMHG | HEART RATE: 111 BPM | BODY MASS INDEX: 30.6 KG/M2 | OXYGEN SATURATION: 92 %

## 2019-01-28 DIAGNOSIS — J44.9 PULMONARY HYPERTENSION DUE TO CHRONIC OBSTRUCTIVE PULMONARY DISEASE: Primary | ICD-10-CM

## 2019-01-28 DIAGNOSIS — R06.82 TACHYPNEA: ICD-10-CM

## 2019-01-28 DIAGNOSIS — I10 ESSENTIAL HYPERTENSION: ICD-10-CM

## 2019-01-28 DIAGNOSIS — J44.9 CHRONIC OBSTRUCTIVE PULMONARY DISEASE, UNSPECIFIED COPD TYPE: ICD-10-CM

## 2019-01-28 DIAGNOSIS — Z79.899 POLYPHARMACY: ICD-10-CM

## 2019-01-28 DIAGNOSIS — I27.23 PULMONARY HYPERTENSION DUE TO CHRONIC OBSTRUCTIVE PULMONARY DISEASE: Primary | ICD-10-CM

## 2019-01-28 DIAGNOSIS — I27.9 CHRONIC PULMONARY HEART DISEASE: Primary | ICD-10-CM

## 2019-01-28 LAB
ALBUMIN SERPL BCP-MCNC: 4.1 G/DL
ALP SERPL-CCNC: 99 U/L
ALT SERPL W/O P-5'-P-CCNC: 24 U/L
ANION GAP SERPL CALC-SCNC: 11 MMOL/L
AST SERPL-CCNC: 42 U/L
BASOPHILS # BLD AUTO: 0.07 K/UL
BASOPHILS NFR BLD: 0.7 %
BILIRUB SERPL-MCNC: 0.5 MG/DL
BNP SERPL-MCNC: 165 PG/ML
BUN SERPL-MCNC: 5 MG/DL
CALCIUM SERPL-MCNC: 9.8 MG/DL
CHLORIDE SERPL-SCNC: 95 MMOL/L
CO2 SERPL-SCNC: 33 MMOL/L
CREAT SERPL-MCNC: 0.9 MG/DL
DIFFERENTIAL METHOD: ABNORMAL
EOSINOPHIL # BLD AUTO: 0.2 K/UL
EOSINOPHIL NFR BLD: 1.9 %
ERYTHROCYTE [DISTWIDTH] IN BLOOD BY AUTOMATED COUNT: 14 %
EST. GFR  (AFRICAN AMERICAN): >60 ML/MIN/1.73 M^2
EST. GFR  (NON AFRICAN AMERICAN): >60 ML/MIN/1.73 M^2
GLUCOSE SERPL-MCNC: 116 MG/DL
HCT VFR BLD AUTO: 43.2 %
HGB BLD-MCNC: 14.3 G/DL
IMM GRANULOCYTES # BLD AUTO: 0.04 K/UL
IMM GRANULOCYTES NFR BLD AUTO: 0.4 %
LYMPHOCYTES # BLD AUTO: 3.2 K/UL
LYMPHOCYTES NFR BLD: 33 %
MAGNESIUM SERPL-MCNC: 1.5 MG/DL
MCH RBC QN AUTO: 32.4 PG
MCHC RBC AUTO-ENTMCNC: 33.1 G/DL
MCV RBC AUTO: 98 FL
MONOCYTES # BLD AUTO: 0.7 K/UL
MONOCYTES NFR BLD: 7.2 %
NEUTROPHILS # BLD AUTO: 5.5 K/UL
NEUTROPHILS NFR BLD: 56.8 %
NRBC BLD-RTO: 0 /100 WBC
PLATELET # BLD AUTO: 299 K/UL
PMV BLD AUTO: 10.3 FL
POTASSIUM SERPL-SCNC: 3.5 MMOL/L
PROT SERPL-MCNC: 7.6 G/DL
RBC # BLD AUTO: 4.42 M/UL
SODIUM SERPL-SCNC: 139 MMOL/L
WBC # BLD AUTO: 9.6 K/UL

## 2019-01-28 PROCEDURE — 99205 OFFICE O/P NEW HI 60 MIN: CPT | Mod: S$GLB,TXP,, | Performed by: STUDENT IN AN ORGANIZED HEALTH CARE EDUCATION/TRAINING PROGRAM

## 2019-01-28 PROCEDURE — 99999 PR PBB SHADOW E&M-EST. PATIENT-LVL IV: ICD-10-PCS | Mod: PBBFAC,TXP,, | Performed by: STUDENT IN AN ORGANIZED HEALTH CARE EDUCATION/TRAINING PROGRAM

## 2019-01-28 PROCEDURE — 3079F DIAST BP 80-89 MM HG: CPT | Mod: CPTII,S$GLB,TXP, | Performed by: STUDENT IN AN ORGANIZED HEALTH CARE EDUCATION/TRAINING PROGRAM

## 2019-01-28 PROCEDURE — 83880 ASSAY OF NATRIURETIC PEPTIDE: CPT | Mod: NTX

## 2019-01-28 PROCEDURE — 80053 COMPREHEN METABOLIC PANEL: CPT | Mod: NTX

## 2019-01-28 PROCEDURE — 3075F PR MOST RECENT SYSTOLIC BLOOD PRESS GE 130-139MM HG: ICD-10-PCS | Mod: CPTII,S$GLB,TXP, | Performed by: STUDENT IN AN ORGANIZED HEALTH CARE EDUCATION/TRAINING PROGRAM

## 2019-01-28 PROCEDURE — 85025 COMPLETE CBC W/AUTO DIFF WBC: CPT | Mod: TXP

## 2019-01-28 PROCEDURE — 3008F BODY MASS INDEX DOCD: CPT | Mod: CPTII,S$GLB,TXP, | Performed by: STUDENT IN AN ORGANIZED HEALTH CARE EDUCATION/TRAINING PROGRAM

## 2019-01-28 PROCEDURE — 3008F PR BODY MASS INDEX (BMI) DOCUMENTED: ICD-10-PCS | Mod: CPTII,S$GLB,TXP, | Performed by: STUDENT IN AN ORGANIZED HEALTH CARE EDUCATION/TRAINING PROGRAM

## 2019-01-28 PROCEDURE — 36415 COLL VENOUS BLD VENIPUNCTURE: CPT | Mod: TXP

## 2019-01-28 PROCEDURE — 3075F SYST BP GE 130 - 139MM HG: CPT | Mod: CPTII,S$GLB,TXP, | Performed by: STUDENT IN AN ORGANIZED HEALTH CARE EDUCATION/TRAINING PROGRAM

## 2019-01-28 PROCEDURE — 99205 PR OFFICE/OUTPT VISIT, NEW, LEVL V, 60-74 MIN: ICD-10-PCS | Mod: S$GLB,TXP,, | Performed by: STUDENT IN AN ORGANIZED HEALTH CARE EDUCATION/TRAINING PROGRAM

## 2019-01-28 PROCEDURE — 99999 PR PBB SHADOW E&M-EST. PATIENT-LVL IV: CPT | Mod: PBBFAC,TXP,, | Performed by: STUDENT IN AN ORGANIZED HEALTH CARE EDUCATION/TRAINING PROGRAM

## 2019-01-28 PROCEDURE — 3079F PR MOST RECENT DIASTOLIC BLOOD PRESSURE 80-89 MM HG: ICD-10-PCS | Mod: CPTII,S$GLB,TXP, | Performed by: STUDENT IN AN ORGANIZED HEALTH CARE EDUCATION/TRAINING PROGRAM

## 2019-01-28 PROCEDURE — 83735 ASSAY OF MAGNESIUM: CPT | Mod: TXP

## 2019-01-28 NOTE — Clinical Note
Please schedule RHC with labs and overnight oximetry.Needs oxygen on exertion- 2L to start-  Qualified by 6mw today

## 2019-01-28 NOTE — PROGRESS NOTES
I have personally taken the history and examined this patient and agree with Dr Cartagena's note as stated above.    I have reviewed diagnostic studies including CT, PFTs, echo, CXR- appears pt's hypoxia is out of proportion to the degree of emphysema seen on her chest imaging (though PFTs do show evidence of obstruction)  Would be worthwhile to proceed with RHC as if pt has severe PAH she might benefit from addition of pulmonary VD therapy.    Stressed the need to wear O2 on exertion- qualifies based on her 6mw today

## 2019-01-28 NOTE — PROGRESS NOTES
Subjective:     57 y/o female patient who is been referred for evaluation of pulmonary hypertension.    HPI:    Case of a 57 y/o female patient with medical history that includes arterial hypertension, anxiety, former smoker (x 30 years, 1 pack/day )  quit on 01/2014 with resultant COPD s/p VATS and wedge resection on 10/2017 due to left upper lung soft tissue nodule noted on CT scan (pathology:bening) who is been evaluated due to elevated estimated PASP on TTE. Patient referred that since the las 4 months has noted mild worsening of her functional capacity mostly limited by exertional  Dyspnea falling on NYHA class II. Patient has denied syncope, palpitations,dizziness or chest pain. Patient has denied hemoptysis. Patient referred been compliant with medical therapy and has denied recent hospitalization due to COPD or intubation. Patient is aware of her exertional hypoxia, but is not interested on using supplemental oxygen. Patient has denied orthopnea.    TTE (01/24/2019):    · Normal left ventricular systolic function. The estimated ejection fraction is 65%  · No wall motion abnormalities.  · Indeterminate left ventricular diastolic function.  · Normal right ventricular systolic function.  · Mild tricuspid regurgitation.  · The estimated PA systolic pressure is 61 mm Hg  · Pulmonary hypertension present.  · Normal central venous pressure (3 mm Hg).      Stress MPI (11/27/2017):    Impression: NORMAL MYOCARDIAL PERFUSION  1. The perfusion scan is free of evidence for myocardial ischemia or injury.   2. Resting wall motion is physiologic.   3. Visually estimated LV function is normal.   4. The ventricular volumes are normal at rest and stress.   5. The extracardiac distribution of radioactivity is normal.   6. There was no previous study available to compare.    CT Scan (12/28/2018):    -Postoperative changes of VATS procedure with left upper lobe wedge resection.  There are few scattered micro nodules in the right  lung stable when compared to CT 10/22/2015.  For example there is a micronodule in the anterior segment of the right upper lobe (axial series 4, image 159).  No evidence of new disease.Band like densities in lower lung zones, likely subsegmental atelectasis.    6MWT:      01/24/2019---------Distance: 304.8 meters (1000 feet)       O2 Sat % Supplemental Oxygen Heart Rate Blood Pressure Erich Scale   Pre-exercise  (Resting) 95 % Room Air 105 bpm 147/81 mmHg 2   During Exercise 78 % Room Air 140 bpm 186/92 mmHg 7-8   Post-exercise    95 % Room Air  109 bpm 162/93 mmHg                Past Medical History:   Diagnosis Date    Anxiety     COPD (chronic obstructive pulmonary disease)     Fractures 2007    History rib fractures with coughing, last in 2007.    History of vitamin D deficiency     mild in past, treated with 50k units Vit D3 weekly, no longer on treatment    Hypertension      Past Surgical History:   Procedure Laterality Date    [id number] N/A 9/20/2017    Performed by [facility] Provider at [facility] 2ND FLR    BONE BIOPSY Right     shoulder - indeterminate (did 2 of the 3 follow ups)    COLONOSCOPY  late 90s?    [facility]  ~2003 or 2004    [facility]    COLONOSCOPY N/A 5/18/2015    Performed by Morris Ortiz Jr., MD at [facility] ENDO    DILATION AND CURETTAGE OF UTERUS  1980    THORACOSCOPY-VIDEO ASSISTED (VATS) W/WEDGE LUNG RESECTION Left 11/30/2017    Performed by Dinesh Ramirez MD at [facility] 2ND FLR    WISDOM TOOTH EXTRACTION      1981       Family History   Problem Relation Age of Onset    Hypertension Mother     Diabetes Mother     Colon cancer Father     Hypertension Father     Retinal detachment Father     COPD Father         hx of MAC infection    Spina bifida Brother     Amblyopia Neg Hx     Blindness Neg Hx     Cataracts Neg Hx     Glaucoma Neg Hx     Macular degeneration Neg Hx     Strabismus Neg Hx     Anesthesia problems Neg Hx        Review of Systems  "  Constitution: Negative for chills, decreased appetite, fever, weight gain and weight loss.   Cardiovascular: Positive for dyspnea on exertion (NYHA II but getting worse since the last months). Negative for chest pain, claudication, cyanosis, leg swelling, near-syncope, orthopnea, palpitations and syncope.   Respiratory: Positive for cough. Negative for hemoptysis.    Musculoskeletal: Negative for joint pain.   Gastrointestinal: Negative for abdominal pain and hematemesis.   Genitourinary: Negative for hematuria.       Objective:   Blood pressure 139/80, pulse (!) 111, height 5' 1" (1.549 m), weight 73.5 kg (162 lb 0.6 oz), SpO2 (!) 92 %.body mass index is 30.62 kg/m².    Physical Exam   Constitutional: She is oriented to person, place, and time. She appears well-developed.   HENT:   Head: Normocephalic.   Eyes: Pupils are equal, round, and reactive to light.   Neck: Normal range of motion. No JVD present.   Cardiovascular: Normal rate.   No murmur heard.  Pulmonary/Chest: Effort normal. No respiratory distress. She has no wheezes. She has no rales.   Abdominal: Soft. She exhibits no distension. There is no tenderness. There is no rebound and no guarding.   Musculoskeletal: Normal range of motion. She exhibits no edema.   Neurological: She is alert and oriented to person, place, and time.   Skin: Skin is warm and dry.       Labs:      Chemistry        Component Value Date/Time     01/28/2019 0822    K 3.5 01/28/2019 0822    CL 95 01/28/2019 0822    CO2 33 (H) 01/28/2019 0822    BUN 5 (L) 01/28/2019 0822    CREATININE 0.9 01/28/2019 0822     (H) 01/28/2019 0822        Component Value Date/Time    CALCIUM 9.8 01/28/2019 0822    ALKPHOS 99 01/28/2019 0822    AST 42 (H) 01/28/2019 0822    ALT 24 01/28/2019 0822    BILITOT 0.5 01/28/2019 0822    ESTGFRAFRICA >60.0 01/28/2019 0822    EGFRNONAA >60.0 01/28/2019 0822          Magnesium   Date Value Ref Range Status   01/28/2019 1.5 (L) 1.6 - 2.6 mg/dL Final "     Lab Results   Component Value Date    WBC 9.60 01/28/2019    HGB 14.3 01/28/2019    HCT 43.2 01/28/2019    MCV 98 01/28/2019     01/28/2019     BNP   Date Value Ref Range Status   01/28/2019 165 (H) 0 - 99 pg/mL Final     Comment:     Values of less than 100 pg/ml are consistent with non-CHF populations.         Assessment:      1. Essential hypertension    2. Pulmonary hypertension due to chronic obstructive pulmonary disease    3. Chronic obstructive pulmonary disease, unspecified COPD type        Plan:   -Patient with exertional dyspnea falling on NYHA II with mild limitations on her ADLs getting worse in the last months in the setting of COPD, exertional hypoxia as documented on the 6 MWT as well TTE suggestive of PH. Chest CT with no evidence of significant parenchymal lung disease in the context of known COPD. Will schedule patient for a RHC for more detailed evaluation of right sided hemodynamics. Highly probable PH WHO Group III but need complete evaluation.  -Nocturnal pulse oximetry ordered and will arrange for supplemental oxygen therapy.  -Patient was oriented regarding the importance of oxygen therapy benefit in the context of hypoxic vasoconstriction and worsening of pulmonary pressure.  -Labs with no major anomalies today except for hypomagnesemia for which patient referred she is suppose to use replacement at home but do not use it because of diarrhea, I recommended to restart it use today for at least 3 days.  -Will follow in 1 month.  -Case discussed with attending physician Dr. Amelia Collins.    Stew Metcalf MD

## 2019-01-28 NOTE — LETTER
January 28, 2019        Lima Lopes  [address]  [place] [address]  Phone: [phone]  Fax: [phone]             [facility]  [address]  [facility] [address]  Phone: [phone]   Patient: Diana Sellers   MR Number: [id number]   YOB: 1960   Date of Visit: 1/28/2019       Dear Dr. Lima Lopes    Thank you for referring Diana Sellers to me for evaluation. Attached you will find relevant portions of my assessment and plan of care.    If you have questions, please do not hesitate to call me. I look forward to following Diana Sellers along with you.    Sincerely,    Stew Metcalf MD    Enclosure    If you would like to receive this communication electronically, please contact [email or web address] or [phone] to request [employer] access.    [employer] is a tool which provides read-only access to select patient information with whom you have a relationship. Its easy to use and provides real time access to review your patients record including encounter summaries, notes, results, and demographic information.    If you feel you have received this communication in error or would no longer like to receive these types of communications, please e-mail [email or web address]

## 2019-01-29 ENCOUNTER — TELEPHONE (OUTPATIENT)
Dept: TRANSPLANT | Facility: CLINIC | Age: 59
End: 2019-01-29

## 2019-01-30 DIAGNOSIS — Z79.899 POLYPHARMACY: Primary | ICD-10-CM

## 2019-01-30 DIAGNOSIS — R06.82 TACHYPNEA: ICD-10-CM

## 2019-01-30 DIAGNOSIS — Z79.01 LONG TERM (CURRENT) USE OF ANTICOAGULANTS: ICD-10-CM

## 2019-02-07 ENCOUNTER — TELEPHONE (OUTPATIENT)
Dept: TRANSPLANT | Facility: CLINIC | Age: 59
End: 2019-02-07

## 2019-07-30 NOTE — TELEPHONE ENCOUNTER
----- Message from Lima Lopes MD sent at 1/10/2019  5:31 PM CST -----  It is correct.  One tablet every Monday, Wednesday and Friday for COPD  Lima    ----- Message -----  From: Sybil May MA  Sent: 1/10/2019   4:21 PM  To: Lima Lopes MD        ----- Message -----  From: Josselin Canseco  Sent: 1/10/2019   4:07 PM  To: Moses MICHELLE Staff    Pharmacy Calling    Reason for call:     Verify direction on prescription     Pharmacy Name:      [facility]    Prescription Name:   azithromycin (Z-IVORY) 250 MG tablet     Phone Number:   [phone]    Additional Information:    
I spoke to the pharmacist. Rx is correct.  One tablet every Monday, Wednesday and Friday for COPD per Dr Lopes.   
Detail Level: Zone
Detail Level: Simple

## 2023-01-25 NOTE — PROCEDURES
Diana Sellers is a 58 y.o.  female patient, who presents for a 6 minute walk test ordered by Isabel Calderon DO.  The diagnosis is Pre Lung Transplant Evaluation, Qualify for Oxygen; COPD/Emphysema.  The patient's BMI is 30.2 kg/m2. Predicted distance (lower limit of normal) is 357.24 meters.    Test Results:    The test was completed with stops.  The patient stopped 1 time for a total of 24 seconds.  The total time walked was 336 seconds.  During walking, the patient reported:  Dyspnea.  The patient used supplemental oxygen during repeat testing.     01/24/2019---------Distance: 304.8 meters (1000 feet)     O2 Sat % Supplemental Oxygen Heart Rate Blood Pressure Erich Scale   Pre-exercise  (Resting) 95 % Room Air 105 bpm 147/81 mmHg 2   During Exercise 78 % Room Air 140 bpm 186/92 mmHg 7-8   Post-exercise   95 % Room Air  109 bpm 162/93 mmHg      Recovery Time:  210 seconds    Oxygen Qualification:     O2 Sat % Supplemental Oxygen Heart Rate Blood Pressure Erich Scale   Pre-exercise  (Resting) 98 % 2 L/M  102 bpm  157/83 mmHg 2    During Exercise   95 %  2 L/M  106 bpm  159/86 mmHg 1    Post-exercise   95 %  2 L/M  106 bpm        Performing nurse/tech:  NIMISHA Elias      PREVIOUS STUDY:   01/18/2017---------Distance: 426.72 meters (1400 feet)       O2 Sat % Supplemental Oxygen Heart Rate Blood Pressure Erich Scale   Pre-exercise  (Resting) 98 % Room Air 86 bpm 113/75 mmHg 2   During Exercise 85 % Room Air 130 bpm 140/79 mmHg 7-8   Post-exercise  (Recovery) 95 % Room Air  98 bpm 136/75 mmHg          CLINICAL INTERPRETATION:  Six minute walk distance is 304.8 meters (1000 feet) with very heavy dyspnea.  During exercise, there was significant desaturation while breathing room air.  Both blood pressure and heart rate increased significantly with walking.  Tachycardia was present prior to exercise.  This may represent a tachycardic response to exercise.  The patient did not report non-pulmonary symptoms  during exercise.  Significant exercise impairment is likely due to desaturation.  The patient did complete the study, walking 336 seconds of the 360 second test.  The patient may benefit from using supplemental oxygen during exertion.  Since the previous study in January 2017, exercise capacity is significantly worse.  Based upon age and body mass index, exercise capacity is less than predicted.   Oxygen saturation did improve while breathing supplemental oxygen.   2 = A lot of assistance

## 2023-11-01 NOTE — TELEPHONE ENCOUNTER
----- Message from Danielle Palomo NP sent at 9/7/2017  9:26 AM CDT -----  Can you please see that this scheduled.    Thanks    Danielle   No

## (undated) DEVICE — DRAPE ABDOMINAL TIBURON 14X11

## (undated) DEVICE — TRAY MINOR GEN SURG

## (undated) DEVICE — CLOSURE SKIN STERI STRIP 1/2X4

## (undated) DEVICE — SUT SILK 0 STRANDS 30IN BLK

## (undated) DEVICE — Device

## (undated) DEVICE — CONTAINER SPECIMEN STRL 4OZ

## (undated) DEVICE — SEE MEDLINE ITEM 146313

## (undated) DEVICE — ELECTRODE REM PLYHSV RETURN 9

## (undated) DEVICE — DRAIN CHEST DRY SUCTION

## (undated) DEVICE — SPONGE IV DRAIN 4X4 STERILE

## (undated) DEVICE — SUT VICRYL 3-0 27 SH

## (undated) DEVICE — SUT SILK 0 BLK BR FSL 18 IN

## (undated) DEVICE — ADHESIVE MASTISOL VIAL 48/BX

## (undated) DEVICE — DRESSING TELFA STRL 4X3 LF

## (undated) DEVICE — SEE MEDLINE ITEM 157117

## (undated) DEVICE — DRESSING TRANS 2X2 TEGADERM

## (undated) DEVICE — DRAIN CHAN RND HUBLS 8MM 24FR

## (undated) DEVICE — BAG TISS RETRV MONARCH 10MM

## (undated) DEVICE — GOWN SMART IMP BREATHABLE XXLG

## (undated) DEVICE — BLADE ELECTRO EDGE INSULATED

## (undated) DEVICE — SPONGE DERMACEA 4X4IN 12PLY

## (undated) DEVICE — SEE MEDLINE ITEM 152622

## (undated) DEVICE — STAPLER ECHELON FLEX GST 45MM

## (undated) DEVICE — DRESSING TRANS 4X4 TEGADERM

## (undated) DEVICE — RELOAD ECHELON ENDOPATH 45MM

## (undated) DEVICE — ELECTRODE BLADE INSULATED 1 IN